# Patient Record
Sex: FEMALE | Race: WHITE | NOT HISPANIC OR LATINO
[De-identification: names, ages, dates, MRNs, and addresses within clinical notes are randomized per-mention and may not be internally consistent; named-entity substitution may affect disease eponyms.]

---

## 2017-05-23 ENCOUNTER — CHART COPY (OUTPATIENT)
Age: 77
End: 2017-05-23

## 2017-10-05 ENCOUNTER — APPOINTMENT (OUTPATIENT)
Dept: ORTHOPEDIC SURGERY | Facility: CLINIC | Age: 77
End: 2017-10-05

## 2017-10-17 ENCOUNTER — APPOINTMENT (OUTPATIENT)
Dept: ORTHOPEDIC SURGERY | Facility: CLINIC | Age: 77
End: 2017-10-17
Payer: MEDICARE

## 2017-10-17 VITALS
HEIGHT: 62 IN | SYSTOLIC BLOOD PRESSURE: 137 MMHG | BODY MASS INDEX: 26.13 KG/M2 | DIASTOLIC BLOOD PRESSURE: 82 MMHG | WEIGHT: 142 LBS | HEART RATE: 71 BPM

## 2017-10-17 PROCEDURE — 73562 X-RAY EXAM OF KNEE 3: CPT | Mod: LT

## 2017-10-17 PROCEDURE — 20610 DRAIN/INJ JOINT/BURSA W/O US: CPT | Mod: LT

## 2017-10-17 PROCEDURE — 99214 OFFICE O/P EST MOD 30 MIN: CPT | Mod: 25

## 2017-11-08 ENCOUNTER — APPOINTMENT (OUTPATIENT)
Dept: ORTHOPEDIC SURGERY | Facility: CLINIC | Age: 77
End: 2017-11-08
Payer: MEDICARE

## 2017-11-08 VITALS
WEIGHT: 140 LBS | HEIGHT: 62 IN | HEART RATE: 93 BPM | DIASTOLIC BLOOD PRESSURE: 90 MMHG | SYSTOLIC BLOOD PRESSURE: 135 MMHG | BODY MASS INDEX: 25.76 KG/M2

## 2017-11-08 PROCEDURE — 96372 THER/PROPH/DIAG INJ SC/IM: CPT

## 2017-11-08 PROCEDURE — 72110 X-RAY EXAM L-2 SPINE 4/>VWS: CPT

## 2017-11-08 PROCEDURE — 99214 OFFICE O/P EST MOD 30 MIN: CPT | Mod: 25

## 2017-11-08 PROCEDURE — 72170 X-RAY EXAM OF PELVIS: CPT | Mod: 59

## 2017-11-08 RX ORDER — NITROFURANTOIN (MONOHYDRATE/MACROCRYSTALS) 25; 75 MG/1; MG/1
100 CAPSULE ORAL
Qty: 14 | Refills: 0 | Status: ACTIVE | COMMUNITY
Start: 2017-10-17

## 2017-11-08 RX ADMIN — KETOROLAC TROMETHAMINE 0 MG/ML: 30 INJECTION, SOLUTION INTRAMUSCULAR; INTRAVENOUS at 00:00

## 2017-11-15 ENCOUNTER — APPOINTMENT (OUTPATIENT)
Dept: UROGYNECOLOGY | Facility: CLINIC | Age: 77
End: 2017-11-15
Payer: MEDICARE

## 2017-11-15 ENCOUNTER — RESULT REVIEW (OUTPATIENT)
Age: 77
End: 2017-11-15

## 2017-11-15 VITALS
SYSTOLIC BLOOD PRESSURE: 132 MMHG | DIASTOLIC BLOOD PRESSURE: 80 MMHG | BODY MASS INDEX: 25.76 KG/M2 | HEIGHT: 62 IN | WEIGHT: 140 LBS

## 2017-11-15 DIAGNOSIS — R35.0 FREQUENCY OF MICTURITION: ICD-10-CM

## 2017-11-15 DIAGNOSIS — R39.15 URGENCY OF URINATION: ICD-10-CM

## 2017-11-15 DIAGNOSIS — N81.10 CYSTOCELE, UNSPECIFIED: ICD-10-CM

## 2017-11-15 DIAGNOSIS — K59.09 OTHER CONSTIPATION: ICD-10-CM

## 2017-11-15 LAB
BILIRUB UR QL STRIP: NORMAL
CLARITY UR: CLEAR
COLLECTION METHOD: NORMAL
GLUCOSE UR-MCNC: NORMAL
HCG UR QL: 0.2 EU/DL
HGB UR QL STRIP.AUTO: NORMAL
KETONES UR-MCNC: NORMAL
LEUKOCYTE ESTERASE UR QL STRIP: NORMAL
NITRITE UR QL STRIP: NORMAL
PH UR STRIP: 5.5
PROT UR STRIP-MCNC: NORMAL
SP GR UR STRIP: 1

## 2017-11-15 PROCEDURE — 51701 INSERT BLADDER CATHETER: CPT

## 2017-11-15 PROCEDURE — 99204 OFFICE O/P NEW MOD 45 MIN: CPT | Mod: 25

## 2017-12-11 ENCOUNTER — APPOINTMENT (OUTPATIENT)
Dept: RADIOLOGY | Facility: CLINIC | Age: 77
End: 2017-12-11

## 2017-12-11 ENCOUNTER — OUTPATIENT (OUTPATIENT)
Dept: OUTPATIENT SERVICES | Facility: HOSPITAL | Age: 77
LOS: 1 days | End: 2017-12-11
Payer: MEDICARE

## 2017-12-11 DIAGNOSIS — Z90.710 ACQUIRED ABSENCE OF BOTH CERVIX AND UTERUS: Chronic | ICD-10-CM

## 2017-12-11 DIAGNOSIS — C50.412 MALIGNANT NEOPLASM OF UPPER-OUTER QUADRANT OF LEFT FEMALE BREAST: ICD-10-CM

## 2017-12-11 DIAGNOSIS — Z98.89 OTHER SPECIFIED POSTPROCEDURAL STATES: Chronic | ICD-10-CM

## 2017-12-11 PROCEDURE — 77080 DXA BONE DENSITY AXIAL: CPT | Mod: 26

## 2017-12-11 PROCEDURE — 77080 DXA BONE DENSITY AXIAL: CPT

## 2018-03-07 ENCOUNTER — APPOINTMENT (OUTPATIENT)
Dept: UROGYNECOLOGY | Facility: CLINIC | Age: 78
End: 2018-03-07

## 2018-08-01 ENCOUNTER — EMERGENCY (EMERGENCY)
Facility: HOSPITAL | Age: 78
LOS: 1 days | Discharge: HOME | End: 2018-08-01
Attending: EMERGENCY MEDICINE | Admitting: EMERGENCY MEDICINE
Payer: MEDICARE

## 2018-08-01 VITALS
HEART RATE: 87 BPM | SYSTOLIC BLOOD PRESSURE: 142 MMHG | TEMPERATURE: 98 F | WEIGHT: 139.99 LBS | OXYGEN SATURATION: 98 % | HEIGHT: 62 IN | DIASTOLIC BLOOD PRESSURE: 72 MMHG | RESPIRATION RATE: 18 BRPM

## 2018-08-01 VITALS
DIASTOLIC BLOOD PRESSURE: 84 MMHG | HEART RATE: 78 BPM | RESPIRATION RATE: 18 BRPM | TEMPERATURE: 98 F | SYSTOLIC BLOOD PRESSURE: 150 MMHG | OXYGEN SATURATION: 98 %

## 2018-08-01 DIAGNOSIS — Z98.89 OTHER SPECIFIED POSTPROCEDURAL STATES: Chronic | ICD-10-CM

## 2018-08-01 DIAGNOSIS — Z90.710 ACQUIRED ABSENCE OF BOTH CERVIX AND UTERUS: Chronic | ICD-10-CM

## 2018-08-01 LAB
ALBUMIN SERPL ELPH-MCNC: 3.7 G/DL — SIGNIFICANT CHANGE UP (ref 3.3–5)
ALP SERPL-CCNC: 63 U/L — SIGNIFICANT CHANGE UP (ref 30–120)
ALT FLD-CCNC: 28 U/L DA — SIGNIFICANT CHANGE UP (ref 10–60)
ANION GAP SERPL CALC-SCNC: 8 MMOL/L — SIGNIFICANT CHANGE UP (ref 5–17)
AST SERPL-CCNC: 24 U/L — SIGNIFICANT CHANGE UP (ref 10–40)
BASOPHILS # BLD AUTO: 0.01 K/UL — SIGNIFICANT CHANGE UP (ref 0–0.2)
BASOPHILS NFR BLD AUTO: 0.2 % — SIGNIFICANT CHANGE UP (ref 0–2)
BILIRUB SERPL-MCNC: 0.6 MG/DL — SIGNIFICANT CHANGE UP (ref 0.2–1.2)
BUN SERPL-MCNC: 14 MG/DL — SIGNIFICANT CHANGE UP (ref 7–23)
CALCIUM SERPL-MCNC: 9.3 MG/DL — SIGNIFICANT CHANGE UP (ref 8.4–10.5)
CHLORIDE SERPL-SCNC: 104 MMOL/L — SIGNIFICANT CHANGE UP (ref 96–108)
CO2 SERPL-SCNC: 28 MMOL/L — SIGNIFICANT CHANGE UP (ref 22–31)
CREAT SERPL-MCNC: 0.67 MG/DL — SIGNIFICANT CHANGE UP (ref 0.5–1.3)
EOSINOPHIL # BLD AUTO: 0.01 K/UL — SIGNIFICANT CHANGE UP (ref 0–0.5)
EOSINOPHIL NFR BLD AUTO: 0.2 % — SIGNIFICANT CHANGE UP (ref 0–6)
ERYTHROCYTE [SEDIMENTATION RATE] IN BLOOD: 13 MM/HR — SIGNIFICANT CHANGE UP (ref 0–20)
GLUCOSE SERPL-MCNC: 110 MG/DL — HIGH (ref 70–99)
HCT VFR BLD CALC: 41.1 % — SIGNIFICANT CHANGE UP (ref 34.5–45)
HGB BLD-MCNC: 13.6 G/DL — SIGNIFICANT CHANGE UP (ref 11.5–15.5)
IMM GRANULOCYTES NFR BLD AUTO: 0.2 % — SIGNIFICANT CHANGE UP (ref 0–1.5)
LYMPHOCYTES # BLD AUTO: 0.58 K/UL — LOW (ref 1–3.3)
LYMPHOCYTES # BLD AUTO: 13 % — SIGNIFICANT CHANGE UP (ref 13–44)
MCHC RBC-ENTMCNC: 29.1 PG — SIGNIFICANT CHANGE UP (ref 27–34)
MCHC RBC-ENTMCNC: 33.1 GM/DL — SIGNIFICANT CHANGE UP (ref 32–36)
MCV RBC AUTO: 88 FL — SIGNIFICANT CHANGE UP (ref 80–100)
MONOCYTES # BLD AUTO: 0.14 K/UL — SIGNIFICANT CHANGE UP (ref 0–0.9)
MONOCYTES NFR BLD AUTO: 3.1 % — SIGNIFICANT CHANGE UP (ref 2–14)
NEUTROPHILS # BLD AUTO: 3.71 K/UL — SIGNIFICANT CHANGE UP (ref 1.8–7.4)
NEUTROPHILS NFR BLD AUTO: 83.3 % — HIGH (ref 43–77)
NRBC # BLD: 0 /100 WBCS — SIGNIFICANT CHANGE UP (ref 0–0)
PLATELET # BLD AUTO: 201 K/UL — SIGNIFICANT CHANGE UP (ref 150–400)
POTASSIUM SERPL-MCNC: 3.8 MMOL/L — SIGNIFICANT CHANGE UP (ref 3.5–5.3)
POTASSIUM SERPL-SCNC: 3.8 MMOL/L — SIGNIFICANT CHANGE UP (ref 3.5–5.3)
PROT SERPL-MCNC: 7.5 G/DL — SIGNIFICANT CHANGE UP (ref 6–8.3)
RBC # BLD: 4.67 M/UL — SIGNIFICANT CHANGE UP (ref 3.8–5.2)
RBC # FLD: 13.1 % — SIGNIFICANT CHANGE UP (ref 10.3–14.5)
SODIUM SERPL-SCNC: 140 MMOL/L — SIGNIFICANT CHANGE UP (ref 135–145)
WBC # BLD: 4.46 K/UL — SIGNIFICANT CHANGE UP (ref 3.8–10.5)
WBC # FLD AUTO: 4.46 K/UL — SIGNIFICANT CHANGE UP (ref 3.8–10.5)

## 2018-08-01 PROCEDURE — 96374 THER/PROPH/DIAG INJ IV PUSH: CPT

## 2018-08-01 PROCEDURE — 85652 RBC SED RATE AUTOMATED: CPT

## 2018-08-01 PROCEDURE — 80053 COMPREHEN METABOLIC PANEL: CPT

## 2018-08-01 PROCEDURE — 85027 COMPLETE CBC AUTOMATED: CPT

## 2018-08-01 PROCEDURE — 99284 EMERGENCY DEPT VISIT MOD MDM: CPT | Mod: 25

## 2018-08-01 PROCEDURE — 86140 C-REACTIVE PROTEIN: CPT

## 2018-08-01 PROCEDURE — 99285 EMERGENCY DEPT VISIT HI MDM: CPT

## 2018-08-01 RX ORDER — ONDANSETRON 8 MG/1
4 TABLET, FILM COATED ORAL ONCE
Qty: 0 | Refills: 0 | Status: DISCONTINUED | OUTPATIENT
Start: 2018-08-01 | End: 2018-08-01

## 2018-08-01 RX ORDER — ONDANSETRON 8 MG/1
1 TABLET, FILM COATED ORAL
Qty: 15 | Refills: 0 | OUTPATIENT
Start: 2018-08-01 | End: 2018-08-05

## 2018-08-01 RX ORDER — ONDANSETRON 8 MG/1
4 TABLET, FILM COATED ORAL ONCE
Qty: 0 | Refills: 0 | Status: COMPLETED | OUTPATIENT
Start: 2018-08-01 | End: 2018-08-01

## 2018-08-01 RX ORDER — MECLIZINE HCL 12.5 MG
1 TABLET ORAL
Qty: 21 | Refills: 0 | OUTPATIENT
Start: 2018-08-01 | End: 2018-08-07

## 2018-08-01 RX ORDER — SODIUM CHLORIDE 9 MG/ML
1000 INJECTION INTRAMUSCULAR; INTRAVENOUS; SUBCUTANEOUS ONCE
Qty: 0 | Refills: 0 | Status: COMPLETED | OUTPATIENT
Start: 2018-08-01 | End: 2018-08-01

## 2018-08-01 RX ORDER — MECLIZINE HCL 12.5 MG
25 TABLET ORAL ONCE
Qty: 0 | Refills: 0 | Status: COMPLETED | OUTPATIENT
Start: 2018-08-01 | End: 2018-08-01

## 2018-08-01 RX ADMIN — SODIUM CHLORIDE 500 MILLILITER(S): 9 INJECTION INTRAMUSCULAR; INTRAVENOUS; SUBCUTANEOUS at 13:17

## 2018-08-01 RX ADMIN — Medication 25 MILLIGRAM(S): at 13:17

## 2018-08-01 RX ADMIN — ONDANSETRON 4 MILLIGRAM(S): 8 TABLET, FILM COATED ORAL at 13:17

## 2018-08-01 RX ADMIN — SODIUM CHLORIDE 1000 MILLILITER(S): 9 INJECTION INTRAMUSCULAR; INTRAVENOUS; SUBCUTANEOUS at 15:05

## 2018-08-01 NOTE — ED ADULT NURSE NOTE - OBJECTIVE STATEMENT
Patient presents to ED with complaints of vertigo, nausea and vomiting that began this morning. As per patient vertigo worsens with head movements and changes in position. Denies any fever, chills or shortness of breath. Reports a mild headache but denies any other pain. Denies any head trauma, fall or LOC.

## 2018-08-01 NOTE — ED ADULT NURSE NOTE - PRO INTERPRETER NEED 2
----- Message from Alicia Parada sent at 3/22/2018 10:16 AM CDT -----  Contact: pt  States the medicine that was called in is making him constipated. Please call pt at [phone]. Thank you   
Returned call, pt stated he was already at ER for constipation  
English

## 2018-08-01 NOTE — ED ADULT NURSE REASSESSMENT NOTE - NS ED NURSE REASSESS COMMENT FT1
Patient was discharged in stable condition, discharge instructions were provided and reviewed, demonstrated understanding, all belongings were taken by patient. Left ambulatory and accompanied by spouse. IV access was discontinued and vital signs taken.

## 2018-08-01 NOTE — ED ADULT NURSE NOTE - FAMILY HISTORY
Father  Still living? No  Family history of pancreatic cancer, Age at diagnosis: Age Unknown     Aunt  Still living? No  Family history of breast cancer, Age at diagnosis: Age Unknown

## 2018-08-01 NOTE — ED ADULT NURSE REASSESSMENT NOTE - NS ED NURSE REASSESS COMMENT FT1
Patient states "feeling better" was given PO fluids tolerated it well. Iv fluids infusing, awaiting results and disposition, nursing care ongoing.

## 2018-08-01 NOTE — ED PROVIDER NOTE - OBJECTIVE STATEMENT
Pt is a 77 y/o F, with PMHx of breast CA s/p chemo, radiation, and masectomy, presenting to the ED with c/o dizziness, onset this AM. Pt reports waking up this AM, got up from bed, and had a sudden onset of room spinning dizziness that caused her to fall backwards onto her bed. No head trauma or LOC. Pt states the dizziness is worse with positional change and head movement. Associated HA, nausea, and vomiting. Denies fever, CP, SOB, weakness, ear pain, and tinnitus. She reports an episode of vertigo 30+yrs ago but is unsure how it was treated.

## 2018-08-01 NOTE — ED PROVIDER NOTE - MEDICAL DECISION MAKING DETAILS
79 yo F with dizziness. Will treat symptomatically and reassess. If sxs do not improve, will consider imaging.

## 2018-08-01 NOTE — CONSULT NOTE ADULT - SUBJECTIVE AND OBJECTIVE BOX
Patient is a 78y old  Female who presents with a chief complaint of     HPI:  Pt is a 79 y/o F, with PMHx of breast CA s/p chemo, radiation, and masectomy, presenting to the ED with c/o dizziness, onset this AM. Pt reports waking up this AM, got up from bed, and had a sudden onset of room spinning dizziness that caused her to fall backwards onto her bed. No head trauma or LOC. Pt states the dizziness is worse with positional change and head movement. Associated HA, nausea, and vomiting. Denies fever, CP, SOB, weakness, ear pain, and tinnitus. She reports an episode of vertigo 30+yrs ago but is unsure how it was treated.      PAST MEDICAL & SURGICAL HISTORY:  Arch pain of right foot  Breast cancer: 2005  Bunion of great toe of right foot  S/P bunionectomy: arch lift 2014  S/P hysterectomy: 1990  S/P lumpectomy, left breast: left 2005      MEDICATIONS  (STANDING):    MEDICATIONS  (PRN):      Allergies    penicillin (Hives; Rash)    Intolerances        SOCIAL HISTORY:    No h/o Smoking.   No h/o alcohol use.  Ex Smoker. Quite in past.  Pt smokes.  Pt does drink socially.  Pt drinks alcohol heavily.    FAMILY HISTORY:  Family history of breast cancer (Aunt)  Family history of pancreatic cancer (Father)      REVIEW OF SYSTEMS:    CONSTITUTIONAL: No fever  EYES: No eye pain,   ENMT:  No sinus or throat pain  NECK: No pain or stiffness  RESPIRATORY: No cough, No hemoptysis; No shortness of breath  CARDIOVASCULAR: No acute chest pain, palpitations,  or leg swelling  GASTROINTESTINAL: No abdominal pain. No nausea, vomiting, or hematemesis;  No melena or hematochezia.  GENITOURINARY: No  hematuria, or incontinence  MUSCULOSKELETAL: No joint swelling; No extremity pain  SKIN: No itching, rashes, or lesions   LYMPH NODES: No enlarged glands  NEUROLOGICAL: No headaches, memory loss,   PSYCHIATRIC: No depression, anxiety, mood swings, or difficulty sleeping  ENDOCRINE: No heat or cold intolerance;   HEME/LYMPH: No easy bruising, or bleeding gums  Allergy/Immunology. No medication allergy. No seasonal allergies.    PHYSICAL EXAM:  Vital Signs Last 24 Hrs  T(F): 97.6 (08-01-18 @ 12:33)  HR: 87 (08-01-18 @ 12:33)  BP: 142/72 (08-01-18 @ 12:33)  RR: 18 (08-01-18 @ 12:33)    GENERAL: NAD, well-groomed, well-developed  HEAD:  Atraumatic, Normocephalic  EYES: EOMI, PERRLA, conjunctiva and sclera clear  NECK: Supple, No JVD, thyroid non-palpable    On Neurological Examination:    Mental Status - Pt is alert, awake, oriented X3. Higher functions are intact. Pt. does have mild poor cognition. Follows commands well and able to answer questions appropriately.    Speech -  Normal. Slurred. Pt has no aphasia.    Cranial Nerves - Pupils 3 mm equal and reactive to light, extraocular eye movements intact. Pt has no visual field deficit.  Pt has no right left facial asymmetry. Tongue - is in midline.    Motor Exam - 4 plus/5 all over, No drift. No shaking or tremors.  Muscle tone - is normal all over. Moves all extremities equally. No asymmetry is seen.      Sensory Exam - Pin prick, temperature, joint position and vibration are intact on either side. Pt withdraws all extremities equally on stimulation. No asymmetry seen. No complaints of tingling, numbness.    Gait - Able to stand and walk unassisted. Pt is able to stand up with holding my hands and is able to walk for few feet around the bed. Not falling to either side.    Deep tendon Reflexes - 2 plus all over.    Coordination - Fine finger movements are normal on both sides. Finger to nose is also normal on both sides.       Romberg - Negative.    Neck Supple -  Yes.    LABS:                        13.6   4.46  )-----------( 201      ( 01 Aug 2018 13:28 )             41.1     08-01    140  |  104  |  14  ----------------------------<  110<H>  3.8   |  28  |  0.67    Ca    9.3      01 Aug 2018 13:28    TPro  7.5  /  Alb  3.7  /  TBili  0.6  /  DBili  x   /  AST  24  /  ALT  28  /  AlkPhos  63  08-01          RADIOLOGY & ADDITIONAL STUDIES: Pt was discharged before I could see her.

## 2018-08-01 NOTE — ED ADULT NURSE NOTE - NSIMPLEMENTINTERV_GEN_ALL_ED
Implemented All Universal Safety Interventions:  [place] to call system. Call bell, personal items and telephone within reach. Instruct patient to call for assistance. Room bathroom lighting operational. Non-slip footwear when patient is off stretcher. Physically safe environment: no spills, clutter or unnecessary equipment. Stretcher in lowest position, wheels locked, appropriate side rails in place.

## 2018-08-02 LAB — CRP SERPL-MCNC: 0.17 MG/DL — SIGNIFICANT CHANGE UP (ref 0–0.4)

## 2018-09-23 ENCOUNTER — INPATIENT (INPATIENT)
Facility: HOSPITAL | Age: 78
LOS: 0 days | Discharge: HOME | DRG: 149 | End: 2018-09-24
Attending: INTERNAL MEDICINE | Admitting: INTERNAL MEDICINE
Payer: COMMERCIAL

## 2018-09-23 VITALS
OXYGEN SATURATION: 99 % | HEIGHT: 62 IN | DIASTOLIC BLOOD PRESSURE: 85 MMHG | SYSTOLIC BLOOD PRESSURE: 155 MMHG | WEIGHT: 141.98 LBS | TEMPERATURE: 97 F | HEART RATE: 85 BPM | RESPIRATION RATE: 14 BRPM

## 2018-09-23 DIAGNOSIS — Z98.89 OTHER SPECIFIED POSTPROCEDURAL STATES: Chronic | ICD-10-CM

## 2018-09-23 DIAGNOSIS — R42 DIZZINESS AND GIDDINESS: ICD-10-CM

## 2018-09-23 DIAGNOSIS — C50.919 MALIGNANT NEOPLASM OF UNSPECIFIED SITE OF UNSPECIFIED FEMALE BREAST: ICD-10-CM

## 2018-09-23 DIAGNOSIS — Z90.710 ACQUIRED ABSENCE OF BOTH CERVIX AND UTERUS: Chronic | ICD-10-CM

## 2018-09-23 DIAGNOSIS — I82.409 ACUTE EMBOLISM AND THROMBOSIS OF UNSPECIFIED DEEP VEINS OF UNSPECIFIED LOWER EXTREMITY: ICD-10-CM

## 2018-09-23 LAB
ALBUMIN SERPL ELPH-MCNC: 3.7 G/DL — SIGNIFICANT CHANGE UP (ref 3.3–5)
ALP SERPL-CCNC: 68 U/L — SIGNIFICANT CHANGE UP (ref 30–120)
ALT FLD-CCNC: 29 U/L DA — SIGNIFICANT CHANGE UP (ref 10–60)
ANION GAP SERPL CALC-SCNC: 3 MMOL/L — LOW (ref 5–17)
APTT BLD: 26.8 SEC — LOW (ref 27.5–37.4)
AST SERPL-CCNC: 24 U/L — SIGNIFICANT CHANGE UP (ref 10–40)
BASOPHILS # BLD AUTO: 0.02 K/UL — SIGNIFICANT CHANGE UP (ref 0–0.2)
BASOPHILS NFR BLD AUTO: 0.3 % — SIGNIFICANT CHANGE UP (ref 0–2)
BILIRUB SERPL-MCNC: 0.4 MG/DL — SIGNIFICANT CHANGE UP (ref 0.2–1.2)
BUN SERPL-MCNC: 17 MG/DL — SIGNIFICANT CHANGE UP (ref 7–23)
CALCIUM SERPL-MCNC: 8.9 MG/DL — SIGNIFICANT CHANGE UP (ref 8.4–10.5)
CHLORIDE SERPL-SCNC: 105 MMOL/L — SIGNIFICANT CHANGE UP (ref 96–108)
CO2 SERPL-SCNC: 30 MMOL/L — SIGNIFICANT CHANGE UP (ref 22–31)
CREAT SERPL-MCNC: 0.63 MG/DL — SIGNIFICANT CHANGE UP (ref 0.5–1.3)
EOSINOPHIL # BLD AUTO: 0.03 K/UL — SIGNIFICANT CHANGE UP (ref 0–0.5)
EOSINOPHIL NFR BLD AUTO: 0.5 % — SIGNIFICANT CHANGE UP (ref 0–6)
GLUCOSE SERPL-MCNC: 104 MG/DL — HIGH (ref 70–99)
HCT VFR BLD CALC: 40 % — SIGNIFICANT CHANGE UP (ref 34.5–45)
HGB BLD-MCNC: 13.2 G/DL — SIGNIFICANT CHANGE UP (ref 11.5–15.5)
IMM GRANULOCYTES NFR BLD AUTO: 0.2 % — SIGNIFICANT CHANGE UP (ref 0–1.5)
INR BLD: 1.01 RATIO — SIGNIFICANT CHANGE UP (ref 0.88–1.16)
LYMPHOCYTES # BLD AUTO: 0.64 K/UL — LOW (ref 1–3.3)
LYMPHOCYTES # BLD AUTO: 10.9 % — LOW (ref 13–44)
MCHC RBC-ENTMCNC: 29.7 PG — SIGNIFICANT CHANGE UP (ref 27–34)
MCHC RBC-ENTMCNC: 33 GM/DL — SIGNIFICANT CHANGE UP (ref 32–36)
MCV RBC AUTO: 90.1 FL — SIGNIFICANT CHANGE UP (ref 80–100)
MONOCYTES # BLD AUTO: 0.27 K/UL — SIGNIFICANT CHANGE UP (ref 0–0.9)
MONOCYTES NFR BLD AUTO: 4.6 % — SIGNIFICANT CHANGE UP (ref 2–14)
NEUTROPHILS # BLD AUTO: 4.89 K/UL — SIGNIFICANT CHANGE UP (ref 1.8–7.4)
NEUTROPHILS NFR BLD AUTO: 83.5 % — HIGH (ref 43–77)
NRBC # BLD: 0 /100 WBCS — SIGNIFICANT CHANGE UP (ref 0–0)
PLATELET # BLD AUTO: 215 K/UL — SIGNIFICANT CHANGE UP (ref 150–400)
POTASSIUM SERPL-MCNC: 3.9 MMOL/L — SIGNIFICANT CHANGE UP (ref 3.5–5.3)
POTASSIUM SERPL-SCNC: 3.9 MMOL/L — SIGNIFICANT CHANGE UP (ref 3.5–5.3)
PROT SERPL-MCNC: 7.3 G/DL — SIGNIFICANT CHANGE UP (ref 6–8.3)
PROTHROM AB SERPL-ACNC: 11 SEC — SIGNIFICANT CHANGE UP (ref 9.8–12.7)
RBC # BLD: 4.44 M/UL — SIGNIFICANT CHANGE UP (ref 3.8–5.2)
RBC # FLD: 12.9 % — SIGNIFICANT CHANGE UP (ref 10.3–14.5)
SODIUM SERPL-SCNC: 138 MMOL/L — SIGNIFICANT CHANGE UP (ref 135–145)
WBC # BLD: 5.86 K/UL — SIGNIFICANT CHANGE UP (ref 3.8–10.5)
WBC # FLD AUTO: 5.86 K/UL — SIGNIFICANT CHANGE UP (ref 3.8–10.5)

## 2018-09-23 PROCEDURE — 99222 1ST HOSP IP/OBS MODERATE 55: CPT | Mod: AI

## 2018-09-23 PROCEDURE — 93010 ELECTROCARDIOGRAM REPORT: CPT

## 2018-09-23 PROCEDURE — 99285 EMERGENCY DEPT VISIT HI MDM: CPT

## 2018-09-23 RX ORDER — LIDOCAINE 4 G/100G
1 CREAM TOPICAL ONCE
Qty: 0 | Refills: 0 | Status: COMPLETED | OUTPATIENT
Start: 2018-09-23 | End: 2018-09-23

## 2018-09-23 RX ORDER — OXYCODONE HYDROCHLORIDE 5 MG/1
5 TABLET ORAL EVERY 4 HOURS
Qty: 0 | Refills: 0 | Status: DISCONTINUED | OUTPATIENT
Start: 2018-09-23 | End: 2018-09-24

## 2018-09-23 RX ORDER — KETOROLAC TROMETHAMINE 30 MG/ML
15 SYRINGE (ML) INJECTION ONCE
Qty: 0 | Refills: 0 | Status: DISCONTINUED | OUTPATIENT
Start: 2018-09-23 | End: 2018-09-23

## 2018-09-23 RX ORDER — MECLIZINE HCL 12.5 MG
25 TABLET ORAL THREE TIMES A DAY
Qty: 0 | Refills: 0 | Status: DISCONTINUED | OUTPATIENT
Start: 2018-09-23 | End: 2018-09-24

## 2018-09-23 RX ORDER — ENOXAPARIN SODIUM 100 MG/ML
40 INJECTION SUBCUTANEOUS DAILY
Qty: 0 | Refills: 0 | Status: DISCONTINUED | OUTPATIENT
Start: 2018-09-23 | End: 2018-09-24

## 2018-09-23 RX ORDER — MECLIZINE HCL 12.5 MG
25 TABLET ORAL ONCE
Qty: 0 | Refills: 0 | Status: COMPLETED | OUTPATIENT
Start: 2018-09-23 | End: 2018-09-23

## 2018-09-23 RX ORDER — ONDANSETRON 8 MG/1
4 TABLET, FILM COATED ORAL ONCE
Qty: 0 | Refills: 0 | Status: COMPLETED | OUTPATIENT
Start: 2018-09-23 | End: 2018-09-23

## 2018-09-23 RX ADMIN — Medication 25 MILLIGRAM(S): at 12:07

## 2018-09-23 RX ADMIN — ONDANSETRON 4 MILLIGRAM(S): 8 TABLET, FILM COATED ORAL at 11:52

## 2018-09-23 RX ADMIN — LIDOCAINE 1 PATCH: 4 CREAM TOPICAL at 13:34

## 2018-09-23 RX ADMIN — Medication 15 MILLIGRAM(S): at 15:00

## 2018-09-23 RX ADMIN — OXYCODONE HYDROCHLORIDE 5 MILLIGRAM(S): 5 TABLET ORAL at 17:41

## 2018-09-23 RX ADMIN — ENOXAPARIN SODIUM 40 MILLIGRAM(S): 100 INJECTION SUBCUTANEOUS at 17:42

## 2018-09-23 RX ADMIN — Medication 15 MILLIGRAM(S): at 13:34

## 2018-09-23 RX ADMIN — OXYCODONE HYDROCHLORIDE 5 MILLIGRAM(S): 5 TABLET ORAL at 21:31

## 2018-09-23 RX ADMIN — OXYCODONE HYDROCHLORIDE 5 MILLIGRAM(S): 5 TABLET ORAL at 22:20

## 2018-09-23 RX ADMIN — Medication 25 MILLIGRAM(S): at 21:31

## 2018-09-23 RX ADMIN — OXYCODONE HYDROCHLORIDE 5 MILLIGRAM(S): 5 TABLET ORAL at 18:22

## 2018-09-23 NOTE — ED PROVIDER NOTE - OBJECTIVE STATEMENT
77 y/o F pt with hx of vertigo, HLD, and breast cancer treated with chemo and radiation 11 years ago presents to the ED c/o dizziness since this morning, upon waking up. Pt states that dizziness aggravated with turning head to the right. Associated with nausea and vomiting. She took a Zofran today, after which she vomitted. Similar sx for which she was seen in the ED a couple weeks ago, treated with Antivert. Pt admits that she has not taken an Antivert today PTA. Denies headache, tinnitus, LOC,  weakness or numbness in extremities. No other complaints at this time.

## 2018-09-23 NOTE — H&P ADULT - NSHPLABSRESULTS_GEN_ALL_CORE
13.2   5.86  )-----------( 215      ( 23 Sep 2018 12:40 )             40.0       09-23    138  |  105  |  17  ----------------------------<  104<H>  3.9   |  30  |  0.63    Ca    8.9      23 Sep 2018 12:40    TPro  7.3  /  Alb  3.7  /  TBili  0.4  /  DBili  x   /  AST  24  /  ALT  29  /  AlkPhos  68  09-23                  PT/INR - ( 23 Sep 2018 12:40 )   PT: 11.0 sec;   INR: 1.01 ratio         PTT - ( 23 Sep 2018 12:40 )  PTT:26.8 sec    Lactate Trend            CAPILLARY BLOOD GLUCOSE

## 2018-09-23 NOTE — CONSULT NOTE ADULT - SUBJECTIVE AND OBJECTIVE BOX
Patient is a 78y old  Female who presents with a chief complaint of Vertigo.    HPI: 77 y/o F pt with hx of vertigo, HLD, and breast cancer treated with chemo and radiation 11 years ago presents to the ED c/o dizziness since this morning, upon waking up. Pt states that dizziness aggravated with turning head to the right. Associated with nausea and vomiting. She took a Zofran today, after which she vomitted. Similar sx for which she was seen in the ED a couple weeks ago, treated with Antivert. Pt admits that she has not taken an Antivert today PTA. Denies headache, tinnitus, LOC,  weakness or numbness in extremities. No other complaints at this time.  No difficulty speaking or swallowing.  No c/o blurry or double vision.  No lateralized weakness.   and daughter are at bedside.  Pt was in ED a month ago for same complaints.  Does admit to put q tips in ears on occasion.  NIHSS  -0  Not a tPa candidate as Dx of CVA is much less likely.    PAST MEDICAL & SURGICAL HISTORY:  Arch pain of right foot  Breast cancer: 2005  Bunion of great toe of right foot  S/P bunionectomy: arch lift 2014  S/P hysterectomy: 1990  S/P lumpectomy, left breast: left 2005    Allergies    penicillin (Hives; Rash)      SOCIAL HISTORY:    No h/o Smoking.   No h/o alcohol use.    FAMILY HISTORY:  Family history of breast cancer (Aunt)  Family history of pancreatic cancer (Father)      REVIEW OF SYSTEMS:    CONSTITUTIONAL: No fever  EYES: No eye pain,   ENMT:  No sinus or throat pain  NECK: No pain or stiffness  RESPIRATORY: No cough, No hemoptysis; No shortness of breath  CARDIOVASCULAR: No acute chest pain, palpitations,  or leg swelling  GASTROINTESTINAL: No abdominal pain. No nausea, vomiting, or hematemesis;  No melena or hematochezia.  GENITOURINARY: No  hematuria, or incontinence  MUSCULOSKELETAL: No joint swelling; No extremity pain  SKIN: No itching, rashes, or lesions   LYMPH NODES: No enlarged glands  NEUROLOGICAL: No headaches, memory loss,   PSYCHIATRIC: No depression, anxiety, mood swings, or difficulty sleeping  ENDOCRINE: No heat or cold intolerance;   HEME/LYMPH: No easy bruising, or bleeding gums  Allergy/Immunology. No medication allergy. No seasonal allergies.  GYN  -H/o Left breast Ca 11 yrs ago.    PHYSICAL EXAM:  Vital Signs Last 24 Hrs  T(F): 97.2 (09-23-18 @ 10:52)  HR: 82 (09-23-18 @ 12:42)  BP: 122/66 (09-23-18 @ 12:42)  RR: 19 (09-23-18 @ 12:42)    GENERAL: NAD, well-groomed, well-developed  HEAD:  Atraumatic, Normocephalic  EYES: EOMI, PERRLA, conjunctiva and sclera clear  NECK: Supple, No JVD, thyroid non-palpable    On Neurological Examination:    Mental Status - Pt is alert, awake, oriented X3. Higher functions are intact.  Follows commands well and able to answer questions appropriately.    Speech -  Normal. Pt has no aphasia.    Cranial Nerves - Pupils 3 mm equal and reactive to light, extraocular eye movements intact. Pt has no visual field deficit.  Pt has no facial asymmetry. Tongue - is in midline.    Motor Exam - 4 plus/5 all over, No drift. No shaking or tremors.  Muscle tone - is normal all over. Moves all extremities equally. No asymmetry is seen.      Sensory Exam - Pin prick, temperature, joint position and vibration are intact on either side. Pt withdraws all extremities equally on stimulation. No asymmetry seen. No complaints of tingling, numbness.    Gait - Able to stand and walk unassisted.     Deep tendon Reflexes - 2 plus all over.    Coordination - Fine finger movements are normal on both sides. Finger to nose is also normal on both sides.       Romberg - Negative.    Neck Supple -  Yes.    LABS:                        13.2   5.86  )-----------( 215      ( 23 Sep 2018 12:40 )             40.0     09-23    138  |  105  |  17  ----------------------------<  104<H>  3.9   |  30  |  0.63    Ca    8.9      23 Sep 2018 12:40    TPro  7.3  /  Alb  3.7  /  TBili  0.4  /  DBili  x   /  AST  24  /  ALT  29  /  AlkPhos  68  09-23    PT/INR - ( 23 Sep 2018 12:40 )   PT: 11.0 sec;   INR: 1.01 ratio        PTT - ( 23 Sep 2018 12:40 )  PTT:26.8 sec    RADIOLOGY & ADDITIONAL STUDIES:    Awaits CT Head

## 2018-09-23 NOTE — H&P ADULT - NSHPPHYSICALEXAM_GEN_ALL_CORE
PHYSICAL EXAM:  Vital Signs Last 24 Hrs  T(C): 36.2 (23 Sep 2018 10:52), Max: 36.2 (23 Sep 2018 10:52)  T(F): 97.2 (23 Sep 2018 10:52), Max: 97.2 (23 Sep 2018 10:52)  HR: 82 (23 Sep 2018 12:42) (80 - 85)  BP: 122/66 (23 Sep 2018 12:42) (122/66 - 155/85)  BP(mean): --  RR: 19 (23 Sep 2018 12:42) (14 - 19)  SpO2: 99% (23 Sep 2018 12:42) (99% - 100%)    GENERAL: positional vertigo/dizzy   HEAD:  Atraumatic, Normocephalic  EYES: EOMI, PERRLA, conjunctiva and sclera clear  ENMT: No tonsillar erythema, exudates, or enlargement; Moist mucous membranes, Good dentition, No lesions  NECK: Supple, No JVD, Normal thyroid  NERVOUS SYSTEM:  Alert & Oriented X3,   Good concentration; Motor Strength 5/5 B/L upper and lower extremities;   CHEST/LUNG: Clear to auscultation bilaterally; No rales, rhonchi, wheezing, or rubs  HEART: Regular rate and rhythm; No murmurs, rubs, or gallops  ABDOMEN: Soft, Nontender, Nondistended; Bowel sounds present  EXTREMITIES:  2+ Peripheral Pulses, No clubbing, cyanosis, or edema  LYMPH: No lymphadenopathy noted  SKIN: No rashes or lesions

## 2018-09-23 NOTE — H&P ADULT - FAMILY HISTORY
Family history of pancreatic cancer     Aunt  Still living? No  Family history of breast cancer, Age at diagnosis: Age Unknown

## 2018-09-23 NOTE — ED ADULT NURSE NOTE - NSIMPLEMENTINTERV_GEN_ALL_ED
Implemented All Fall with Harm Risk Interventions:  [place] to call system. Call bell, personal items and telephone within reach. Instruct patient to call for assistance. Room bathroom lighting operational. Non-slip footwear when patient is off stretcher. Physically safe environment: no spills, clutter or unnecessary equipment. Stretcher in lowest position, wheels locked, appropriate side rails in place. Provide visual cue, wrist band, yellow gown, etc. Monitor gait and stability. Monitor for mental status changes and reorient to person, place, and time. Review medications for side effects contributing to fall risk. Reinforce activity limits and safety measures with patient and family. Provide visual clues: red socks.

## 2018-09-23 NOTE — CONSULT NOTE ADULT - ASSESSMENT
Pt is seen for Dizziness.  C/o Vertigo.  No lateralized weakness.  No seizure activity is reported.  Limited but non focal exam.  CT Head - pending.  No signs of CVA.  H/o left Breast Ca  -Brain mets would need to be ruled out.  Pt did not have any scans to r/o mets for quite some time.  Symptoms likely secondary to Vestibuloneuronitis/ Labrynthitis/Benign positional vertigo.  Recommend Meclizine 12.5 mg PO TID standing dose for now.  MRI Brain pre/post with gado in am.  D/w ED physician, Dr. Etienne.  D/w Pt//daughter at bedside. Questions answered.  Would continue to follow.

## 2018-09-23 NOTE — ED ADULT NURSE NOTE - CHPI ED NUR SYMPTOMS NEG
no change in level of consciousness/no numbness/no fever/no confusion/no blurred vision/no loss of consciousness/no vomiting/no weakness

## 2018-09-23 NOTE — H&P ADULT - HISTORY OF PRESENT ILLNESS
77 y/o F pt with hx of vertigo, HLD, and breast cancer treated with chemo and radiation 11 years ago presents to the ED c/o dizziness since this morning, upon waking up. Pt states that dizziness aggravated with turning head to the right. Associated with nausea and vomiting. She took a Zofran today, after which she vomitted. Similar sx for which she was seen in the ED a couple weeks ago, treated with Antivert. Pt admits that she has not taken an Antivert today PTA. Denies headache, tinnitus, LOC,  weakness or numbness in extremities. No other complaints at, was not offered TPA as likely to be CVA effect, pt denies any cp, palptaiton, sob, abdominal or urianry changes

## 2018-09-23 NOTE — ED ADULT NURSE REASSESSMENT NOTE - NS ED NURSE REASSESS COMMENT FT1
pt requesting pain meds explained muscle relaxants will aggravate vertigo s/s wants heating pad explained already gave self burn to back called dr bower and ordered morphine but pt refusing morphine md aware pt states okay just let me go to my bed I am okay  report to [facility] and pt transported
pt states feeling better now dizziness better nausea better evaluated by dr castillo and to be admitted for mri in am
pt still c/o back pains wants heat pack explained already has redness to back from heat pack yesterday pt pending admission called [facility] spoke with david and will call back when ready
pt c/o back pains and increased with stretcher repositioned and meds given for pains pending admission

## 2018-09-23 NOTE — H&P ADULT - NSHPREVIEWOFSYSTEMS_GEN_ALL_CORE
REVIEW OF SYSTEMS:  CONSTITUTIONAL: dizziness/vertigo  EYES: No eye pain, visual disturbances, or discharge  ENMT:  No difficulty hearing, tinnitus, vertigo; No sinus or throat pain  NECK: No pain or stiffness  BREASTS: No pain, masses, or nipple discharge  RESPIRATORY: No cough, wheezing, chills or hemoptysis; No shortness of breath  CARDIOVASCULAR: No chest pain, palpitations, dizziness, or leg swelling  GASTROINTESTINAL: No abdominal or epigastric pain.  GENITOURINARY: No dysuria, frequency, hematuria, or incontinence  NEUROLOGICAL: No headaches, memory loss, loss of strength, numbness, or tremors  SKIN: No itching, burning, rashes, or lesions   LYMPH NODES: No enlarged glands  ENDOCRINE: No heat or cold intolerance; No hair loss; No polydipsia or polyuria  HEME/LYMPH: No easy bruising, or bleeding gums  ALLERGY AND IMMUNOLOGIC: No hives or eczema

## 2018-09-23 NOTE — H&P ADULT - ASSESSMENT
77 y/o F pt with hx of vertigo, HLD, and breast cancer treated with chemo and radiation 11 years ago presents to the ED c/o dizziness since this morning, upon waking up. Pt states that dizziness aggravated with turning head to the right. Associated with nausea and vomiting, admitted for vertigo, need mri to r/o metastatic process

## 2018-09-23 NOTE — ED ADULT NURSE NOTE - OBJECTIVE STATEMENT
pt c/o dizziness worse with lying flat and turning head to right side h/o vertigo in past on no meds now no headache no chest pains pt aaox3 skin warm and dry no resp distress lungs clear and equal b/l ascultation abd soft non tender + bs pt c/o dizziness worse with lying flat and turning head to right side h/o vertigo in past on no meds now no headache no chest pains pt aaox3 skin warm and dry no resp distress lungs clear and equal b/l ascultation abd soft non tender + bs  1st degree burn to back due to heating pad pt was applying for pains yesterday

## 2018-09-24 ENCOUNTER — OUTPATIENT (OUTPATIENT)
Dept: OUTPATIENT SERVICES | Facility: HOSPITAL | Age: 78
LOS: 1 days | End: 2018-09-24
Payer: COMMERCIAL

## 2018-09-24 ENCOUNTER — TRANSCRIPTION ENCOUNTER (OUTPATIENT)
Age: 78
End: 2018-09-24

## 2018-09-24 VITALS
SYSTOLIC BLOOD PRESSURE: 113 MMHG | DIASTOLIC BLOOD PRESSURE: 64 MMHG | OXYGEN SATURATION: 98 % | RESPIRATION RATE: 16 BRPM | TEMPERATURE: 98 F | HEART RATE: 60 BPM

## 2018-09-24 DIAGNOSIS — Z90.710 ACQUIRED ABSENCE OF BOTH CERVIX AND UTERUS: Chronic | ICD-10-CM

## 2018-09-24 DIAGNOSIS — Z98.89 OTHER SPECIFIED POSTPROCEDURAL STATES: Chronic | ICD-10-CM

## 2018-09-24 DIAGNOSIS — R42 DIZZINESS AND GIDDINESS: ICD-10-CM

## 2018-09-24 LAB
ALBUMIN SERPL ELPH-MCNC: 3.3 G/DL — SIGNIFICANT CHANGE UP (ref 3.3–5)
ALP SERPL-CCNC: 59 U/L — SIGNIFICANT CHANGE UP (ref 30–120)
ALT FLD-CCNC: 26 U/L DA — SIGNIFICANT CHANGE UP (ref 10–60)
ANION GAP SERPL CALC-SCNC: 6 MMOL/L — SIGNIFICANT CHANGE UP (ref 5–17)
AST SERPL-CCNC: 21 U/L — SIGNIFICANT CHANGE UP (ref 10–40)
BILIRUB SERPL-MCNC: 0.3 MG/DL — SIGNIFICANT CHANGE UP (ref 0.2–1.2)
BUN SERPL-MCNC: 16 MG/DL — SIGNIFICANT CHANGE UP (ref 7–23)
CALCIUM SERPL-MCNC: 8.8 MG/DL — SIGNIFICANT CHANGE UP (ref 8.4–10.5)
CHLORIDE SERPL-SCNC: 105 MMOL/L — SIGNIFICANT CHANGE UP (ref 96–108)
CO2 SERPL-SCNC: 28 MMOL/L — SIGNIFICANT CHANGE UP (ref 22–31)
CREAT SERPL-MCNC: 0.61 MG/DL — SIGNIFICANT CHANGE UP (ref 0.5–1.3)
GLUCOSE SERPL-MCNC: 91 MG/DL — SIGNIFICANT CHANGE UP (ref 70–99)
HCT VFR BLD CALC: 37.9 % — SIGNIFICANT CHANGE UP (ref 34.5–45)
HGB BLD-MCNC: 12.4 G/DL — SIGNIFICANT CHANGE UP (ref 11.5–15.5)
MCHC RBC-ENTMCNC: 29.6 PG — SIGNIFICANT CHANGE UP (ref 27–34)
MCHC RBC-ENTMCNC: 32.7 GM/DL — SIGNIFICANT CHANGE UP (ref 32–36)
MCV RBC AUTO: 90.5 FL — SIGNIFICANT CHANGE UP (ref 80–100)
NRBC # BLD: 0 /100 WBCS — SIGNIFICANT CHANGE UP (ref 0–0)
PLATELET # BLD AUTO: 223 K/UL — SIGNIFICANT CHANGE UP (ref 150–400)
POTASSIUM SERPL-MCNC: 3.8 MMOL/L — SIGNIFICANT CHANGE UP (ref 3.5–5.3)
POTASSIUM SERPL-SCNC: 3.8 MMOL/L — SIGNIFICANT CHANGE UP (ref 3.5–5.3)
PROT SERPL-MCNC: 6.7 G/DL — SIGNIFICANT CHANGE UP (ref 6–8.3)
RBC # BLD: 4.19 M/UL — SIGNIFICANT CHANGE UP (ref 3.8–5.2)
RBC # FLD: 13 % — SIGNIFICANT CHANGE UP (ref 10.3–14.5)
SODIUM SERPL-SCNC: 139 MMOL/L — SIGNIFICANT CHANGE UP (ref 135–145)
WBC # BLD: 4.59 K/UL — SIGNIFICANT CHANGE UP (ref 3.8–10.5)
WBC # FLD AUTO: 4.59 K/UL — SIGNIFICANT CHANGE UP (ref 3.8–10.5)

## 2018-09-24 PROCEDURE — G8979: CPT

## 2018-09-24 PROCEDURE — 97161 PT EVAL LOW COMPLEX 20 MIN: CPT

## 2018-09-24 PROCEDURE — G8980: CPT

## 2018-09-24 PROCEDURE — 36415 COLL VENOUS BLD VENIPUNCTURE: CPT

## 2018-09-24 PROCEDURE — 85610 PROTHROMBIN TIME: CPT

## 2018-09-24 PROCEDURE — 93005 ELECTROCARDIOGRAM TRACING: CPT

## 2018-09-24 PROCEDURE — 85730 THROMBOPLASTIN TIME PARTIAL: CPT

## 2018-09-24 PROCEDURE — 99285 EMERGENCY DEPT VISIT HI MDM: CPT

## 2018-09-24 PROCEDURE — G8978: CPT

## 2018-09-24 PROCEDURE — 80053 COMPREHEN METABOLIC PANEL: CPT

## 2018-09-24 PROCEDURE — 85027 COMPLETE CBC AUTOMATED: CPT

## 2018-09-24 PROCEDURE — 70553 MRI BRAIN STEM W/O & W/DYE: CPT | Mod: 26

## 2018-09-24 PROCEDURE — 70553 MRI BRAIN STEM W/O & W/DYE: CPT

## 2018-09-24 PROCEDURE — 99239 HOSP IP/OBS DSCHRG MGMT >30: CPT

## 2018-09-24 PROCEDURE — A9579: CPT

## 2018-09-24 RX ORDER — MECLIZINE HCL 12.5 MG
1 TABLET ORAL
Qty: 90 | Refills: 0 | OUTPATIENT
Start: 2018-09-24 | End: 2018-10-23

## 2018-09-24 RX ADMIN — OXYCODONE HYDROCHLORIDE 5 MILLIGRAM(S): 5 TABLET ORAL at 06:00

## 2018-09-24 RX ADMIN — LIDOCAINE 1 PATCH: 4 CREAM TOPICAL at 00:47

## 2018-09-24 RX ADMIN — Medication 25 MILLIGRAM(S): at 13:18

## 2018-09-24 RX ADMIN — Medication 25 MILLIGRAM(S): at 05:03

## 2018-09-24 RX ADMIN — OXYCODONE HYDROCHLORIDE 5 MILLIGRAM(S): 5 TABLET ORAL at 05:05

## 2018-09-24 NOTE — DISCHARGE NOTE ADULT - CARE PLAN
Principal Discharge DX:	Vertigo  Goal:	CVA ruled out  Assessment and plan of treatment:	likely vestibular disease   trial of meclizine  f/u ENT as outpt  Secondary Diagnosis:	Bunion of great toe of right foot

## 2018-09-24 NOTE — PROGRESS NOTE ADULT - PROBLEM SELECTOR PLAN 1
ct head no acute findnigs  check orthostatic  mri of brain if negtative will d/c with outpt ENT follow up  trial of meclizine  pt amarisal

## 2018-09-24 NOTE — DISCHARGE NOTE ADULT - CARE PROVIDER_API CALL
Tyrel Joshi), Medicine  [address] [place] [address]  Phone: [phone]  Fax: [phone]

## 2018-09-24 NOTE — DISCHARGE NOTE ADULT - MEDICATION SUMMARY - MEDICATIONS TO TAKE
I will START or STAY ON the medications listed below when I get home from the hospital:    Zofran ODT 4 mg oral tablet, disintegrating  -- 1 tab(s) by mouth 3 times a day   -- Indication: For nasuea    Antivert 25 mg oral tablet  -- 1 tab(s) by mouth 3 times a day   -- May cause drowsiness.  Alcohol may intensify this effect.  Use care when operating dangerous machinery.    -- Indication: For Vertigo    Lipitor  -- Indication: For hld

## 2018-09-24 NOTE — DISCHARGE NOTE ADULT - PATIENT PORTAL LINK FT
You can access the [facility] Patient Portal, offered by [facility], by registering with the following website: [email or web address]

## 2018-09-24 NOTE — DISCHARGE NOTE ADULT - HOSPITAL COURSE
77 y/o F pt with hx of vertigo, HLD, and breast cancer treated with chemo and radiation 11 years ago presents to the ED c/o dizziness since this morning, upon waking up. Pt states that dizziness aggravated with turning head to the right. Associated with nausea and vomiting, admitted for vertigo, need mri to r/o metastatic process     : Vertigo.  Plan: ct head no acute findnigs  mri of brain negtative will d/c with outpt ENT follow up  trial of meclizine  pt eval  Breast cancer.  Plan: f/u nc as outpt, was treated with chemo/radiation 11 years ago.       DVT (deep venous thrombosis).  Plan: dvt prophalxis with lovenox.     pt can proceed with d/c planning with close outpt follow up

## 2018-09-24 NOTE — PROGRESS NOTE ADULT - ASSESSMENT
Pt is seen for Dizziness.  C/o Vertigo.  No lateralized weakness.  No seizure activity is reported.  Non focal exam.  H/o left Breast Ca  -Brain mets would need to be ruled out.  Pt did not have any scans to r/o mets for quite some time.  Symptoms likely secondary to Vestibuloneuronitis/ Labrynthitis/Benign positional vertigo.  MRI Brain pre/post with gado is done  -Unremarkable  CVA/Mass/Mets ruled out.  D/w Pt//daughter at bedside. Questions answered.  Neurologically pt could be discharged home on Meclizine 12.5 mg PO TID PRN.  F/up with her PMD. Pt is seen for Dizziness.  C/o Vertigo.  No lateralized weakness.  No seizure activity is reported.  Non focal exam.  H/o left Breast Ca  -Brain mets would need to be ruled out.  Pt did not have any scans to r/o mets for quite some time.  Symptoms likely secondary to Vestibuloneuronitis/ Labrynthitis/Benign positional vertigo.  MRI Brain pre/post with gado is done  -Unremarkable  CVA/Mass/Mets ruled out.  D/w Pt. Questions answered.  Neurologically pt could be discharged home on Meclizine 12.5 mg PO TID PRN.  F/up with her PMD.  D/w covering nurse.

## 2018-09-24 NOTE — PROGRESS NOTE ADULT - ASSESSMENT
79 y/o F pt with hx of vertigo, HLD, and breast cancer treated with chemo and radiation 11 years ago presents to the ED c/o dizziness since this morning, upon waking up. Pt states that dizziness aggravated with turning head to the right. Associated with nausea and vomiting, admitted for vertigo, need mri to r/o metastatic process

## 2018-09-24 NOTE — PROGRESS NOTE ADULT - SUBJECTIVE AND OBJECTIVE BOX
Patient is a 78y old  Female who presents with a chief complaint of vertigo (23 Sep 2018 13:55)        HPI:  77 y/o F pt with hx of vertigo, HLD, and breast cancer treated with chemo and radiation 11 years ago presents to the ED c/o dizziness since this morning, upon waking up. Pt states that dizziness aggravated with turning head to the right. Associated with nausea and vomiting. She took a Zofran today, after which she vomitted. Similar sx for which she was seen in the ED a couple weeks ago, treated with Antivert. Pt admits that she has not taken an Antivert today PTA. Denies headache, tinnitus, LOC,  weakness or numbness in extremities. No other complaints at, was not offered TPA as likely to be CVA effect, pt denies any cp, palptaiton, sob, abdominal or urianry changes (23 Sep 2018 13:55)      SUBJECTIVE & OBJECTIVE: Pt seen and examined at bedside , dizzy     PHYSICAL EXAM:  T(C): 36.6 (09-24-18 @ 07:39), Max: 37 (09-23-18 @ 22:00)  HR: 60 (09-24-18 @ 07:39) (60 - 99)  BP: 113/64 (09-24-18 @ 07:39) (108/73 - 155/85)  RR: 16 (09-24-18 @ 07:39) (14 - 19)  SpO2: 98% (09-24-18 @ 07:39) (95% - 100%)  Wt(kg): -- Height (cm): 157.48 (09-23 @ 10:52)  Weight (kg): 64.4 (09-23 @ 10:52)  BMI (kg/m2): 26 (09-23 @ 10:52)  BSA (m2): 1.65 (09-23 @ 10:52)  GENERAL: NAD, well-groomed, well-developed  HEAD:  Atraumatic, Normocephalic  EYES: EOMI, PERRLA, conjunctiva and sclera clear  ENMT: Moist mucous membranes  NECK: Supple, No JVD  NERVOUS SYSTEM:  Alert & Oriented X3,  CHEST/LUNG: Clear to auscultation bilaterally; No rales, rhonchi, wheezing, or rubs  HEART: Regular rate and rhythm; No murmurs, rubs, or gallops  ABDOMEN: Soft, Nontender, Nondistended; Bowel sounds present  EXTREMITIES:  2+ Peripheral Pulses, No clubbing, cyanosis, or edema        MEDICATIONS  (STANDING):  enoxaparin Injectable 40 milliGRAM(s) SubCutaneous daily  meclizine 25 milliGRAM(s) Oral three times a day    MEDICATIONS  (PRN):  oxyCODONE    IR 5 milliGRAM(s) Oral every 4 hours PRN Moderate Pain (4 - 6)      LABS:                        12.4   4.59  )-----------( 223      ( 24 Sep 2018 06:18 )             37.9     09-24    139  |  105  |  16  ----------------------------<  91  3.8   |  28  |  0.61    Ca    8.8      24 Sep 2018 06:18    TPro  6.7  /  Alb  3.3  /  TBili  0.3  /  DBili  x   /  AST  21  /  ALT  26  /  AlkPhos  59  09-24    PT/INR - ( 23 Sep 2018 12:40 )   PT: 11.0 sec;   INR: 1.01 ratio         PTT - ( 23 Sep 2018 12:40 )  PTT:26.8 sec      CAPILLARY BLOOD GLUCOSE          CAPILLARY BLOOD GLUCOSE        CAPILLARY BLOOD GLUCOSE                RECENT CULTURES:      RADIOLOGY & ADDITIONAL TESTS:                        DVT/GI ppx  Discussed with pt @ bedside

## 2018-09-24 NOTE — PHYSICAL THERAPY INITIAL EVALUATION ADULT - PERTINENT HX OF CURRENT PROBLEM, REHAB EVAL
Pt is a 77 yo female presents to the ED c/o dizziness since this morning, upon waking up. Pt states that dizziness aggravated with turning head to the right. Associated with nausea and vomiting.  MRI head: unremarkable

## 2018-09-24 NOTE — PROGRESS NOTE ADULT - SUBJECTIVE AND OBJECTIVE BOX
Neurology Follow up note    YEMI MARTINEZ 78y Female    HPI:  79 y/o F pt with hx of vertigo, HLD, and breast cancer treated with chemo and radiation 11 years ago presents to the ED c/o dizziness since this morning, upon waking up. Pt states that dizziness aggravated with turning head to the right. Associated with nausea and vomiting. She took a Zofran today, after which she vomitted. Similar sx for which she was seen in the ED a couple weeks ago, treated with Antivert. Pt admits that she has not taken an Antivert today PTA. Denies headache, tinnitus, LOC,  weakness or numbness in extremities. No other complaints at, was not offered TPA as likely to be CVA effect, pt denies any cp, palptaiton, sob, abdominal or urianry changes (23 Sep 2018 13:55)    Interval History -    Patient is seen, chart was reviewed and case was discussed with the treatment team.  Pt is not in any distress.   Lying on bed comfortably.       Vital Signs Last 24 Hrs  T(C): 36.6 (24 Sep 2018 07:39), Max: 37 (23 Sep 2018 22:00)  T(F): 97.8 (24 Sep 2018 07:39), Max: 98.6 (23 Sep 2018 22:00)  HR: 60 (24 Sep 2018 07:39) (60 - 99)  BP: 113/64 (24 Sep 2018 07:39) (108/73 - 135/65)  BP(mean): --  RR: 16 (24 Sep 2018 07:39) (14 - 19)  SpO2: 98% (24 Sep 2018 07:39) (95% - 99%)       MEDICATIONS    enoxaparin Injectable 40 milliGRAM(s) SubCutaneous daily  meclizine 25 milliGRAM(s) Oral three times a day  oxyCODONE    IR 5 milliGRAM(s) Oral every 4 hours PRN    Allergies    penicillin (Hives; Rash)      REVIEW OF SYSTEMS:    CONSTITUTIONAL: No fever  EYES: No eye pain,   ENMT:  No sinus or throat pain  NECK: No pain or stiffness  RESPIRATORY: No cough, No hemoptysis; No shortness of breath  CARDIOVASCULAR: No acute chest pain, palpitations,  or leg swelling  GASTROINTESTINAL: No abdominal pain. No nausea, vomiting, or hematemesis;  No melena or hematochezia.  GENITOURINARY: No  hematuria, or incontinence  MUSCULOSKELETAL: No joint swelling; No extremity pain  SKIN: No itching, rashes, or lesions   LYMPH NODES: No enlarged glands  NEUROLOGICAL: No headaches, memory loss,   PSYCHIATRIC: No depression, anxiety, mood swings, or difficulty sleeping  ENDOCRINE: No heat or cold intolerance;   HEME/LYMPH: No easy bruising, or bleeding gums  Allergy/Immunology. No medication allergy. No seasonal allergies.  GYN  -H/o Left breast Ca 11 yrs ago.    PHYSICAL EXAM:  GENERAL: NAD, well-groomed, well-developed  HEAD:  Atraumatic, Normocephalic  EYES: EOMI, PERRLA, conjunctiva and sclera clear  NECK: Supple, No JVD, thyroid non-palpable    On Neurological Examination:    Mental Status - Pt is alert, awake, oriented X3. Higher functions are intact.  Follows commands well and able to answer questions appropriately.    Speech -  Normal. Pt has no aphasia.    Cranial Nerves - Pupils 3 mm equal and reactive to light, extraocular eye movements intact. Pt has no visual field deficit.  Pt has no facial asymmetry. Tongue - is in midline.    Motor Exam - 4 plus/5 all over, No drift. No shaking or tremors.  Muscle tone - is normal all over. Moves all extremities equally. No asymmetry is seen.      Sensory Exam - Pin prick, temperature, joint position and vibration are intact on either side. Pt withdraws all extremities equally on stimulation. No asymmetry seen. No complaints of tingling, numbness.    Gait - Able to stand and walk unassisted.     Deep tendon Reflexes - 2 plus all over.    Coordination - Fine finger movements are normal on both sides. Finger to nose is also normal on both sides.       Romberg - Negative.    Neck Supple -  Yes.    LABS:    CBC Full  -  ( 24 Sep 2018 06:18 )  WBC Count : 4.59 K/uL  Hemoglobin : 12.4 g/dL  Hematocrit : 37.9 %  Platelet Count - Automated : 223 K/uL  Mean Cell Volume : 90.5 fl  Mean Cell Hemoglobin : 29.6 pg  Mean Cell Hemoglobin Concentration : 32.7 gm/dL    09-24    139  |  105  |  16  ----------------------------<  91  3.8   |  28  |  0.61    Ca    8.8      24 Sep 2018 06:18    TPro  6.7  /  Alb  3.3  /  TBili  0.3  /  DBili  x   /  AST  21  /  ALT  26  /  AlkPhos  59  09-24      Radiology  -    < from: MR Head w/wo IV Cont (09.24.18 @ 10:45) >    EXAM:  MR BRAIN WAW IC                            PROCEDURE DATE:  09/24/2018          INTERPRETATION:  MRI brain with and without contrast    History vertigo    Contrast Gadavist 7 cc; 0.5 cc discarded    There is no enhancing intracranial mass lesion. There is no mass effect,   cortical edema or hydrocephalus. Cortical volume and white matter signal   are relatively preserved. There is no evidence of acute infarct or   previous parenchymal hemorrhage. The orbital and sellar contents and   cerebellar tonsils are within normal limits. There is a small incidental   developmental venous anomaly in the right occipital lobe.    IMPRESSION:    Unremarkable      KENNETH HEAD M.D., ATTENDING RADIOLOGIST  This document has been electronically signed. Sep 24 2018 11:01AM        < end of copied text > Neurology Follow up note    YEMI MARTINEZ 78y Female came with certigo    HPI:  79 y/o F pt with hx of vertigo, HLD, and breast cancer treated with chemo and radiation 11 years ago presents to the ED c/o dizziness since this morning, upon waking up. Pt states that dizziness aggravated with turning head to the right. Associated with nausea and vomiting. She took a Zofran today, after which she vomitted. Similar sx for which she was seen in the ED a couple weeks ago, treated with Antivert. Pt admits that she has not taken an Antivert today PTA. Denies headache, tinnitus, LOC,  weakness or numbness in extremities. No other complaints at, was not offered TPA as likely to be CVA effect, pt denies any cp, palptaiton, sob, abdominal or urianry changes (23 Sep 2018 13:55)    Interval History -    Patient is seen, chart was reviewed and case was discussed with the treatment team.  Pt is not in any distress.   Sitting on chair comfortably.       Vital Signs Last 24 Hrs  T(C): 36.6 (24 Sep 2018 07:39), Max: 37 (23 Sep 2018 22:00)  T(F): 97.8 (24 Sep 2018 07:39), Max: 98.6 (23 Sep 2018 22:00)  HR: 60 (24 Sep 2018 07:39) (60 - 99)  BP: 113/64 (24 Sep 2018 07:39) (108/73 - 135/65)  BP(mean): --  RR: 16 (24 Sep 2018 07:39) (14 - 19)  SpO2: 98% (24 Sep 2018 07:39) (95% - 99%)       MEDICATIONS    enoxaparin Injectable 40 milliGRAM(s) SubCutaneous daily  meclizine 25 milliGRAM(s) Oral three times a day  oxyCODONE    IR 5 milliGRAM(s) Oral every 4 hours PRN    Allergies    penicillin (Hives; Rash)      REVIEW OF SYSTEMS:    CONSTITUTIONAL: No fever  EYES: No eye pain,   ENMT:  No sinus or throat pain  NECK: No pain or stiffness  RESPIRATORY: No cough, No hemoptysis; No shortness of breath  CARDIOVASCULAR: No acute chest pain, palpitations,  or leg swelling  GASTROINTESTINAL: No abdominal pain. No nausea, vomiting, or hematemesis;  No melena or hematochezia.  GENITOURINARY: No  hematuria, or incontinence  MUSCULOSKELETAL: No joint swelling; No extremity pain  SKIN: No itching, rashes, or lesions   LYMPH NODES: No enlarged glands  NEUROLOGICAL: No headaches, memory loss,   PSYCHIATRIC: No depression, anxiety, mood swings, or difficulty sleeping  ENDOCRINE: No heat or cold intolerance;   HEME/LYMPH: No easy bruising, or bleeding gums  Allergy/Immunology. No medication allergy. No seasonal allergies.  GYN  - H/o Left breast Ca 11 yrs ago.    PHYSICAL EXAM:  GENERAL: NAD, well-groomed, well-developed  HEAD:  Atraumatic, Normocephalic  EYES: EOMI, PERRLA, conjunctiva and sclera clear  NECK: Supple, No JVD, thyroid non-palpable    On Neurological Examination:    Mental Status - Pt is alert, awake, oriented X3. Higher functions are intact.  Follows commands well and able to answer questions appropriately.    Speech -  Normal. Pt has no aphasia.    Cranial Nerves - Pupils 3 mm equal and reactive to light, extraocular eye movements intact. Pt has no visual field deficit.  Pt has no facial asymmetry. Tongue - is in midline.    Motor Exam - 4 plus/5 all over, No drift. No shaking or tremors.  Muscle tone - is normal all over. Moves all extremities equally. No asymmetry is seen.      Sensory Exam - Pin prick, temperature, joint position and vibration are intact on either side. Pt withdraws all extremities equally on stimulation. No asymmetry seen. No complaints of tingling, numbness.    Gait - Able to stand and walk unassisted.     Deep tendon Reflexes - 2 plus all over.    Coordination - Fine finger movements are normal on both sides. Finger to nose is also normal on both sides.       Romberg - Negative.    Neck Supple -  Yes.    LABS:    CBC Full  -  ( 24 Sep 2018 06:18 )  WBC Count : 4.59 K/uL  Hemoglobin : 12.4 g/dL  Hematocrit : 37.9 %  Platelet Count - Automated : 223 K/uL  Mean Cell Volume : 90.5 fl  Mean Cell Hemoglobin : 29.6 pg  Mean Cell Hemoglobin Concentration : 32.7 gm/dL    09-24    139  |  105  |  16  ----------------------------<  91  3.8   |  28  |  0.61    Ca    8.8      24 Sep 2018 06:18    TPro  6.7  /  Alb  3.3  /  TBili  0.3  /  DBili  x   /  AST  21  /  ALT  26  /  AlkPhos  59  09-24      Radiology  -    < from: MR Head w/wo IV Cont (09.24.18 @ 10:45) >    EXAM:  MR BRAIN WAW IC                            PROCEDURE DATE:  09/24/2018          INTERPRETATION:  MRI brain with and without contrast    History vertigo    Contrast Gadavist 7 cc; 0.5 cc discarded    There is no enhancing intracranial mass lesion. There is no mass effect,   cortical edema or hydrocephalus. Cortical volume and white matter signal   are relatively preserved. There is no evidence of acute infarct or   previous parenchymal hemorrhage. The orbital and sellar contents and   cerebellar tonsils are within normal limits. There is a small incidental   developmental venous anomaly in the right occipital lobe.    IMPRESSION:    Unremarkable      KENNETH HEAD M.D., ATTENDING RADIOLOGIST  This document has been electronically signed. Sep 24 2018 11:01AM        < end of copied text >

## 2018-12-25 ENCOUNTER — EMERGENCY (EMERGENCY)
Facility: HOSPITAL | Age: 78
LOS: 1 days | Discharge: HOME | End: 2018-12-25
Attending: EMERGENCY MEDICINE | Admitting: EMERGENCY MEDICINE
Payer: MEDICARE

## 2018-12-25 VITALS
RESPIRATION RATE: 15 BRPM | DIASTOLIC BLOOD PRESSURE: 83 MMHG | TEMPERATURE: 98 F | HEART RATE: 89 BPM | SYSTOLIC BLOOD PRESSURE: 148 MMHG | OXYGEN SATURATION: 98 %

## 2018-12-25 VITALS
OXYGEN SATURATION: 98 % | HEIGHT: 63 IN | TEMPERATURE: 97 F | HEART RATE: 102 BPM | RESPIRATION RATE: 16 BRPM | WEIGHT: 139.99 LBS | DIASTOLIC BLOOD PRESSURE: 85 MMHG | SYSTOLIC BLOOD PRESSURE: 133 MMHG

## 2018-12-25 DIAGNOSIS — Z98.89 OTHER SPECIFIED POSTPROCEDURAL STATES: Chronic | ICD-10-CM

## 2018-12-25 DIAGNOSIS — Z90.710 ACQUIRED ABSENCE OF BOTH CERVIX AND UTERUS: Chronic | ICD-10-CM

## 2018-12-25 PROCEDURE — 71045 X-RAY EXAM CHEST 1 VIEW: CPT

## 2018-12-25 PROCEDURE — 99283 EMERGENCY DEPT VISIT LOW MDM: CPT | Mod: 25

## 2018-12-25 PROCEDURE — 96372 THER/PROPH/DIAG INJ SC/IM: CPT

## 2018-12-25 PROCEDURE — 71045 X-RAY EXAM CHEST 1 VIEW: CPT | Mod: 26

## 2018-12-25 PROCEDURE — 99283 EMERGENCY DEPT VISIT LOW MDM: CPT

## 2018-12-25 RX ORDER — OXYCODONE AND ACETAMINOPHEN 5; 325 MG/1; MG/1
1 TABLET ORAL ONCE
Qty: 0 | Refills: 0 | Status: DISCONTINUED | OUTPATIENT
Start: 2018-12-25 | End: 2018-12-25

## 2018-12-25 RX ORDER — METHOCARBAMOL 500 MG/1
2 TABLET, FILM COATED ORAL
Qty: 30 | Refills: 0 | OUTPATIENT
Start: 2018-12-25 | End: 2018-12-29

## 2018-12-25 RX ORDER — IBUPROFEN 200 MG
1 TABLET ORAL
Qty: 20 | Refills: 0 | OUTPATIENT
Start: 2018-12-25 | End: 2018-12-29

## 2018-12-25 RX ORDER — ACETAMINOPHEN 500 MG
650 TABLET ORAL ONCE
Qty: 0 | Refills: 0 | Status: COMPLETED | OUTPATIENT
Start: 2018-12-25 | End: 2018-12-25

## 2018-12-25 RX ORDER — DIAZEPAM 5 MG
5 TABLET ORAL ONCE
Qty: 0 | Refills: 0 | Status: DISCONTINUED | OUTPATIENT
Start: 2018-12-25 | End: 2018-12-25

## 2018-12-25 RX ORDER — METHOCARBAMOL 500 MG/1
1000 TABLET, FILM COATED ORAL ONCE
Qty: 0 | Refills: 0 | Status: COMPLETED | OUTPATIENT
Start: 2018-12-25 | End: 2018-12-25

## 2018-12-25 RX ORDER — ATORVASTATIN CALCIUM 80 MG/1
0 TABLET, FILM COATED ORAL
Qty: 0 | Refills: 0 | COMMUNITY

## 2018-12-25 RX ORDER — KETOROLAC TROMETHAMINE 30 MG/ML
30 SYRINGE (ML) INJECTION ONCE
Qty: 0 | Refills: 0 | Status: DISCONTINUED | OUTPATIENT
Start: 2018-12-25 | End: 2018-12-25

## 2018-12-25 RX ADMIN — OXYCODONE AND ACETAMINOPHEN 1 TABLET(S): 5; 325 TABLET ORAL at 16:04

## 2018-12-25 RX ADMIN — OXYCODONE AND ACETAMINOPHEN 1 TABLET(S): 5; 325 TABLET ORAL at 15:20

## 2018-12-25 RX ADMIN — METHOCARBAMOL 1000 MILLIGRAM(S): 500 TABLET, FILM COATED ORAL at 13:42

## 2018-12-25 RX ADMIN — Medication 650 MILLIGRAM(S): at 14:34

## 2018-12-25 RX ADMIN — Medication 30 MILLIGRAM(S): at 13:42

## 2018-12-25 RX ADMIN — Medication 30 MILLIGRAM(S): at 13:48

## 2018-12-25 RX ADMIN — Medication 650 MILLIGRAM(S): at 14:27

## 2018-12-25 RX ADMIN — Medication 5 MILLIGRAM(S): at 14:27

## 2018-12-25 NOTE — ED PROVIDER NOTE - NSFOLLOWUPINSTRUCTIONS_ED_ALL_ED_FT
1. FOLLOW UP WITH YOUR PRIMARY DOCTOR IN 24-48 HOURS.   2. FOLLOW UP WITH ALL SPECALIST DISCUSSED DURING YOUR VISIT.   3. TAKE ALL MEDICATIONS PRESCRIBED IN THE ER.   4. RETURN FOR WORSENING SYMPTOMS INCLUDING BUT NOT LIMITED TO FEVER, CHEST PAIN, OR TROUBLE BREATHING.  5. take ibuprofen 600mg every 6 hours for pain or tylenol 650mg every 6 hours  6. if pain is not resolved. add robaxin as prescribed. do not drive when taking  7. use warm moist heat on back.

## 2018-12-25 NOTE — ED PROVIDER NOTE - CHPI ED SYMPTOMS NEG
no motor function loss/no tingling/no numbness/no bladder dysfunction/no difficulty bearing weight/no bowel dysfunction

## 2018-12-25 NOTE — ED PROVIDER NOTE - CARE PROVIDER_API CALL
Homar Smith), Orthopaedic Surgery  [address] [place] [address]  Phone: [phone]  Fax: [phone]

## 2018-12-25 NOTE — ED PROVIDER NOTE - OBJECTIVE STATEMENT
pt is a 77yo female with pmhx of breast cancer c/o muscle spasm x yesterday. pt reports pain started as left upper back spasm and eventually moved to the right side mid-back. pt reports she went to [employer] and picked up heavy items, associating that with pain. pt reports she had previous episodes, seen by spine doctor and was rx robaxin and meloxicam. pt reports she took medications with minimal relief.

## 2018-12-25 NOTE — ED ADULT NURSE NOTE - ED STAT RN HANDOFF DETAILS
Endorse pt to next shift RN. Awaiting reevaluation and disposition. Pt is sleeping at the moment. No distress.

## 2018-12-25 NOTE — ED PROVIDER NOTE - PHYSICAL EXAMINATION
Spine Exam:     Cervical: No erythema, ecchymosis, or visible deformity. No midline tenderness or step-off appreciated on palpation. No paravertebral tenderness. No muscle spasm. FROM. NEG NEXUS criteria.          Thoracic: No erythema, ecchymosis, or visible deformity. No midline tenderness or step-off appreciated on palpation. + R paravertebral tenderness. + R  muscle spasm.           Lumbosacral: No erythema, ecchymosis, or visible deformity. No midline tenderness or step-off appreciated on palpation. No paravertebral tenderness. No muscle spasm.  FROM BL UE AND BLLE. GAIT INTACT. SENSATION INTACT.

## 2018-12-25 NOTE — ED PROVIDER NOTE - PROGRESS NOTE DETAILS
Evelyn Bazzi for Dr Chidi Ritchie: 79 yo F with hx of spine pain, seen by Dr Parr recently for eval. Had negative Xrays. c/o repeat back pain after twisting her body while lifting heavy object two days ago. Took some pills had remaining at home with no improvement. Denies bladder/bowel dysfunction or other injuries/sxs.  PE: non tender spine, FROM, no deformity, extremities normal, neuro normal.  Plan: pain meds, muscle relaxer, and f/u with ortho tomorrow. pt still in pain will give oxycodone and do cxr. cxr reviewed. pt advised she must follow up with spine . Discussed with Patient and/or Family regarding the X-ray findings.  Discussed the risks of occult/secondary fracture or ligamentous/tendon injury. Discussed the importance of close prompt follow-up with Orthopedics for definitive workup and treatment.  Also discussed injury/neurologic precautions.  Verbalization of understanding of all instructions was shown and an opportunity was given for questions. All imaging and labs reviewed. all results reviewed with pt including abnormal results. pt given a copy of results. pt advised to follow up with pmd regarding abnormal results. All questions answered and concerns addressed. pt verbalized understanding and agreement with plan and dx. pt advised on next step and when/where to follow up. pt advised on all take home and otc medications. pt advised to follow up with PMD. pt advised to return to ed for worsening symptoms including fever, cp, sob. will dc. cxr reviewed. pt advised she must follow up with spine . Discussed with Patient and/or Family regarding the X-ray findings.  Discussed the risks of occult/secondary fracture or ligamentous/tendon injury. Discussed the importance of close prompt follow-up with Orthopedics for definitive workup and treatment.  Also discussed injury/neurologic precautions.  Verbalization of understanding of all instructions was shown and an opportunity was given for questions. All imaging and labs reviewed. all results reviewed with pt including abnormal results. pt given a copy of results. pt advised to follow up with pmd regarding abnormal results. All questions answered and concerns addressed. pt verbalized understanding and agreement with plan and dx. pt advised on next step and when/where to follow up. pt advised on all take home and otc medications. pt advised to follow up with PMD. pt advised to return to ed for worsening symptoms including fever, cp, sob. will dc. willk rx robaxin and ibuprofen pt sitting up in bed eating cookies without issue.. pt improved. cxr reviewed. pt advised she must follow up with spine . Discussed with Patient and/or Family regarding the X-ray findings.  Discussed the risks of occult/secondary fracture or ligamentous/tendon injury. Discussed the importance of close prompt follow-up with Orthopedics for definitive workup and treatment.  Also discussed injury/neurologic precautions.  Verbalization of understanding of all instructions was shown and an opportunity was given for questions. All imaging and labs reviewed. all results reviewed with pt including abnormal results. pt given a copy of results. pt advised to follow up with pmd regarding abnormal results. All questions answered and concerns addressed. pt verbalized understanding and agreement with plan and dx. pt advised on next step and when/where to follow up. pt advised on all take home and otc medications. pt advised to follow up with PMD. pt advised to return to ed for worsening symptoms including fever, cp, sob. will dc. willk rx robaxin and ibuprofen

## 2018-12-25 NOTE — ED PROVIDER NOTE - MUSCULOSKELETAL, MLM
-check blood cultures x 2 sets  -check a urine culture  -check a Streptococcus pneumoniae urinary antigen  -check a Legionella urinary antigen  -the patient received IV vancomycin, IV cefepime, and IV clindamycin in the emergency department  -I will have the patient on IV levofloxacin and IV metronidazole to cover for community-acquired pneumonia pathogens and aspiration pneumonia pathogens see above

## 2018-12-25 NOTE — ED PROVIDER NOTE - NSFOLLOWUPCLINICS_GEN_ALL_ED_FT
[facility] [place]  Neurosurgery/Spine  [address], [address]  [place] [address]  Phone: [phone]  Fax:   Follow Up Time:     [facility] - [facility]  Neurosurgery/Spine  [address] [place] [id number]  Phone: [phone]  Fax:   Follow Up Time:

## 2019-01-02 ENCOUNTER — APPOINTMENT (OUTPATIENT)
Dept: ORTHOPEDIC SURGERY | Facility: CLINIC | Age: 79
End: 2019-01-02
Payer: MEDICARE

## 2019-01-02 VITALS
DIASTOLIC BLOOD PRESSURE: 83 MMHG | HEART RATE: 99 BPM | WEIGHT: 144 LBS | BODY MASS INDEX: 26.5 KG/M2 | SYSTOLIC BLOOD PRESSURE: 140 MMHG | HEIGHT: 62 IN

## 2019-01-02 PROCEDURE — 99214 OFFICE O/P EST MOD 30 MIN: CPT

## 2019-01-02 PROCEDURE — 72070 X-RAY EXAM THORAC SPINE 2VWS: CPT

## 2019-01-02 RX ORDER — MELOXICAM 15 MG/1
15 TABLET ORAL
Qty: 30 | Refills: 2 | Status: DISCONTINUED | COMMUNITY
Start: 2017-11-08 | End: 2019-01-02

## 2019-06-20 ENCOUNTER — CHART COPY (OUTPATIENT)
Age: 79
End: 2019-06-20

## 2020-01-06 ENCOUNTER — APPOINTMENT (OUTPATIENT)
Dept: MRI IMAGING | Facility: CLINIC | Age: 80
End: 2020-01-06
Payer: MEDICARE

## 2020-01-06 ENCOUNTER — OUTPATIENT (OUTPATIENT)
Dept: OUTPATIENT SERVICES | Facility: HOSPITAL | Age: 80
LOS: 1 days | End: 2020-01-06
Payer: MEDICARE

## 2020-01-06 DIAGNOSIS — Z90.710 ACQUIRED ABSENCE OF BOTH CERVIX AND UTERUS: Chronic | ICD-10-CM

## 2020-01-06 DIAGNOSIS — Z98.89 OTHER SPECIFIED POSTPROCEDURAL STATES: Chronic | ICD-10-CM

## 2020-01-06 DIAGNOSIS — Z00.8 ENCOUNTER FOR OTHER GENERAL EXAMINATION: ICD-10-CM

## 2020-01-06 PROCEDURE — 72146 MRI CHEST SPINE W/O DYE: CPT | Mod: 26

## 2020-01-06 PROCEDURE — 72146 MRI CHEST SPINE W/O DYE: CPT

## 2020-01-08 ENCOUNTER — APPOINTMENT (OUTPATIENT)
Dept: ORTHOPEDIC SURGERY | Facility: CLINIC | Age: 80
End: 2020-01-08
Payer: MEDICARE

## 2020-01-08 ENCOUNTER — CHART COPY (OUTPATIENT)
Age: 80
End: 2020-01-08

## 2020-01-08 VITALS — HEIGHT: 62 IN | HEART RATE: 87 BPM | DIASTOLIC BLOOD PRESSURE: 75 MMHG | SYSTOLIC BLOOD PRESSURE: 143 MMHG

## 2020-01-08 DIAGNOSIS — M54.6 PAIN IN THORACIC SPINE: ICD-10-CM

## 2020-01-08 DIAGNOSIS — M54.5 LOW BACK PAIN: ICD-10-CM

## 2020-01-08 PROCEDURE — 99214 OFFICE O/P EST MOD 30 MIN: CPT | Mod: 25

## 2020-01-08 PROCEDURE — 96372 THER/PROPH/DIAG INJ SC/IM: CPT

## 2020-01-08 RX ORDER — KETOROLAC TROMETHAMINE 30 MG/ML
30 INJECTION, SOLUTION INTRAMUSCULAR; INTRAVENOUS
Qty: 1 | Refills: 0 | Status: COMPLETED | OUTPATIENT
Start: 2020-01-08

## 2020-01-08 RX ORDER — DICLOFENAC SODIUM 75 MG/1
75 TABLET, DELAYED RELEASE ORAL
Qty: 1 | Refills: 1 | Status: ACTIVE | COMMUNITY
Start: 2020-01-08 | End: 1900-01-01

## 2020-01-08 RX ORDER — METHOCARBAMOL 500 MG/1
500 TABLET, FILM COATED ORAL 3 TIMES DAILY
Qty: 60 | Refills: 0 | Status: ACTIVE | COMMUNITY
Start: 2017-11-08 | End: 1900-01-01

## 2020-01-08 RX ADMIN — KETOROLAC TROMETHAMINE 0 MG/ML: 30 INJECTION, SOLUTION INTRAMUSCULAR; INTRAVENOUS at 00:00

## 2020-01-08 NOTE — PHYSICAL EXAM
[Full] : is full [Normal] : normal [LE] : Sensory: Intact in bilateral lower extremities [Knee] : patellar 2+ and symmetric bilaterally [DP] : dorsalis pedis 2+ and symmetric bilaterally [Ankle] : ankle 2+ and symmetric bilaterally [PT] : posterior tibial 2+ and symmetric bilaterally [Poor Appearance] : well-appearing [de-identified] : EXAM: MR SPINE THORACIC \par \par PROCEDURE DATE: 01/06/2020 \par \par INTERPRETATION: MRI of thoracic spine without contrast \par \par History acute right-sided back pain \par \par There is slightly exaggerated thoracic kyphosis without compression fracture \par or subluxation or marrow infiltration or edema. The spinal canal is \par congenitally capacious. The spinal cord is normal in signal and contour. \par There is no epidural mass or collection. Moderately severe degenerative disc \par change and mild broad dorsal osteophyte involves the T12-L1 level mildly \par deforming the ventral thecal sac. There is moderate degenerative disc space \par narrowing without dorsal osteophyte in the upper to mid thoracic levels. \par There is no spinal stenosis. \par \par IMPRESSION: \par \par Kyphosis and spondylosis, typical for age without spinal canal compromise \par \par KENNETH HEAD M.D., ATTENDING RADIOLOGIST \par This document has been electronically signed. Jan 7 2020 12:03PM  [Abl Mood] : in a normal mood [Obese] : not obese [Abl Affect] : with normal affect [Acute Distress] : not in acute distress [Poor Coordination] : normal coordination [Painful] : not painful [Disorientation] : oriented x 3 [de-identified] : She can forward flex to her ankles and extend to 30° without pain. [FreeTextEntry2] : The pt is awake, alert and oriented to self, place and time, is comfortable and in no acute distress. Gait examination reveals a narrow based, non-ataxic, non-antalgic gait. Can heel and toe walk without difficulty. Inspection of neck, back and lower extremities bilaterally reveals no rashes or ecchymotic lesions.  There is no obvious abnormal spinal curvature in the sagittal and coronal planes with a slightly stooped forward posture noted. There is no tenderness over the cervical, thoracic or lumbar spine, or the upper and lower extremities musculature. No paraspinal lumbar tenderness. There is no sacroiliac tenderness. No greater trochanteric tenderness bilaterally. No atrophy or abnormal movements noted in the upper or lower extremities. There is no swelling noted in the upper or lower extremities bilaterally. No cervical lymphadenopathy noted anteriorly. No joint laxity noted in the upper and lower extremity joints bilaterally.\par  Hip range of motion is degrees internal rotation 30° external rotation without pain. Full range of motion of the shoulders bilaterally with no significant pain\par Negative straight leg raise to 45° in the sitting position bilaterally. There is no groin pain with hip internal rotation and a negative JULIAN test bilaterally.  [SLR] : negative straight leg raise [de-identified] : seen with her

## 2020-01-08 NOTE — DISCUSSION/SUMMARY
[Medication Risks Reviewed] : Medication risks reviewed [de-identified] : The patient reports acute onset of left-sided low back pain after cleaning the garage on Sunday 3 days ago. She tried meloxicam without relief. She has been prescribed diclofenac by the nurse practitioner but has not taken it today. For acute pain offered her an injection of Toradol and she wanted to proceed. Under sterile conditions 30 mg of Toradol was administered intramuscularly by the LPN without incident. Prescription of methocarbamol was also provided. Recommended follow up in 2 weeks if her symptoms do not improve. Recommended use of a lumbar corset over the next few days as well.\par \par The patient was educated regarding their condition, treatment options as well as prescribed course of treatment. \par Risks and benefits as well as alternatives to the proposed treatment were also provided to the patient \par They were given the opportunity to have all their questions answered to their satisfaction.\par \par Vital signs were reviewed with the patient and the patient was instructed to followup with their primary care provider for further management.\par \par Healthy lifestyle recommendations were also made including a tobacco free lifestyle, proper diet, and weight control.

## 2020-01-08 NOTE — HISTORY OF PRESENT ILLNESS
[Stable] : stable [None] : No relieving factors are noted [Daily] : ~He/She~ states the symptoms seem to be occuring daily [de-identified] : spasms daily [de-identified] : Patient is here today to review mri thoracic spine 1/6/20. Cleaning the garage on sunday 1/5/20. Started getting spasms left lower back. Feels similar to right sided back pain she has had in the past. \par took meloxicam/advil since sunday without relief.

## 2020-01-16 ENCOUNTER — EMERGENCY (EMERGENCY)
Facility: HOSPITAL | Age: 80
LOS: 1 days | Discharge: HOME | End: 2020-01-16
Attending: EMERGENCY MEDICINE | Admitting: EMERGENCY MEDICINE
Payer: MEDICARE

## 2020-01-16 VITALS
OXYGEN SATURATION: 98 % | DIASTOLIC BLOOD PRESSURE: 75 MMHG | WEIGHT: 119.93 LBS | HEIGHT: 62 IN | RESPIRATION RATE: 18 BRPM | HEART RATE: 111 BPM | SYSTOLIC BLOOD PRESSURE: 152 MMHG | TEMPERATURE: 99 F

## 2020-01-16 VITALS
TEMPERATURE: 98 F | HEART RATE: 100 BPM | OXYGEN SATURATION: 96 % | DIASTOLIC BLOOD PRESSURE: 68 MMHG | SYSTOLIC BLOOD PRESSURE: 132 MMHG | RESPIRATION RATE: 17 BRPM

## 2020-01-16 DIAGNOSIS — Z98.89 OTHER SPECIFIED POSTPROCEDURAL STATES: Chronic | ICD-10-CM

## 2020-01-16 DIAGNOSIS — Z90.710 ACQUIRED ABSENCE OF BOTH CERVIX AND UTERUS: Chronic | ICD-10-CM

## 2020-01-16 LAB
ALBUMIN SERPL ELPH-MCNC: 3.6 G/DL — SIGNIFICANT CHANGE UP (ref 3.3–5)
ALP SERPL-CCNC: 57 U/L — SIGNIFICANT CHANGE UP (ref 30–120)
ALT FLD-CCNC: 26 U/L DA — SIGNIFICANT CHANGE UP (ref 10–60)
ANION GAP SERPL CALC-SCNC: 7 MMOL/L — SIGNIFICANT CHANGE UP (ref 5–17)
APPEARANCE UR: CLEAR — SIGNIFICANT CHANGE UP
APTT BLD: 27.1 SEC — LOW (ref 28.5–37)
AST SERPL-CCNC: 22 U/L — SIGNIFICANT CHANGE UP (ref 10–40)
BASOPHILS # BLD AUTO: 0 K/UL — SIGNIFICANT CHANGE UP (ref 0–0.2)
BASOPHILS NFR BLD AUTO: 0 % — SIGNIFICANT CHANGE UP (ref 0–2)
BILIRUB SERPL-MCNC: 0.5 MG/DL — SIGNIFICANT CHANGE UP (ref 0.2–1.2)
BILIRUB UR-MCNC: NEGATIVE — SIGNIFICANT CHANGE UP
BUN SERPL-MCNC: 20 MG/DL — SIGNIFICANT CHANGE UP (ref 7–23)
CALCIUM SERPL-MCNC: 8.8 MG/DL — SIGNIFICANT CHANGE UP (ref 8.4–10.5)
CHLORIDE SERPL-SCNC: 102 MMOL/L — SIGNIFICANT CHANGE UP (ref 96–108)
CO2 SERPL-SCNC: 30 MMOL/L — SIGNIFICANT CHANGE UP (ref 22–31)
COLOR SPEC: YELLOW — SIGNIFICANT CHANGE UP
CREAT SERPL-MCNC: 0.7 MG/DL — SIGNIFICANT CHANGE UP (ref 0.5–1.3)
DIFF PNL FLD: NEGATIVE — SIGNIFICANT CHANGE UP
EOSINOPHIL # BLD AUTO: 0.03 K/UL — SIGNIFICANT CHANGE UP (ref 0–0.5)
EOSINOPHIL NFR BLD AUTO: 0.6 % — SIGNIFICANT CHANGE UP (ref 0–6)
FLU A RESULT: SIGNIFICANT CHANGE UP
FLU A RESULT: SIGNIFICANT CHANGE UP
FLUAV AG NPH QL: SIGNIFICANT CHANGE UP
FLUBV AG NPH QL: SIGNIFICANT CHANGE UP
GLUCOSE SERPL-MCNC: 117 MG/DL — HIGH (ref 70–99)
GLUCOSE UR QL: NEGATIVE MG/DL — SIGNIFICANT CHANGE UP
HCT VFR BLD CALC: 43.6 % — SIGNIFICANT CHANGE UP (ref 34.5–45)
HGB BLD-MCNC: 14.3 G/DL — SIGNIFICANT CHANGE UP (ref 11.5–15.5)
IMM GRANULOCYTES NFR BLD AUTO: 0.2 % — SIGNIFICANT CHANGE UP (ref 0–1.5)
INR BLD: 1.06 RATIO — SIGNIFICANT CHANGE UP (ref 0.88–1.16)
KETONES UR-MCNC: ABNORMAL
LACTATE SERPL-SCNC: 0.8 MMOL/L — SIGNIFICANT CHANGE UP (ref 0.7–2)
LEUKOCYTE ESTERASE UR-ACNC: NEGATIVE — SIGNIFICANT CHANGE UP
LIDOCAIN IGE QN: 174 U/L — SIGNIFICANT CHANGE UP (ref 73–393)
LYMPHOCYTES # BLD AUTO: 0.25 K/UL — LOW (ref 1–3.3)
LYMPHOCYTES # BLD AUTO: 4.7 % — LOW (ref 13–44)
MCHC RBC-ENTMCNC: 29.5 PG — SIGNIFICANT CHANGE UP (ref 27–34)
MCHC RBC-ENTMCNC: 32.8 GM/DL — SIGNIFICANT CHANGE UP (ref 32–36)
MCV RBC AUTO: 89.9 FL — SIGNIFICANT CHANGE UP (ref 80–100)
MONOCYTES # BLD AUTO: 0.24 K/UL — SIGNIFICANT CHANGE UP (ref 0–0.9)
MONOCYTES NFR BLD AUTO: 4.5 % — SIGNIFICANT CHANGE UP (ref 2–14)
NEUTROPHILS # BLD AUTO: 4.82 K/UL — SIGNIFICANT CHANGE UP (ref 1.8–7.4)
NEUTROPHILS NFR BLD AUTO: 90 % — HIGH (ref 43–77)
NITRITE UR-MCNC: NEGATIVE — SIGNIFICANT CHANGE UP
NRBC # BLD: 0 /100 WBCS — SIGNIFICANT CHANGE UP (ref 0–0)
PH UR: 7 — SIGNIFICANT CHANGE UP (ref 5–8)
PLATELET # BLD AUTO: 217 K/UL — SIGNIFICANT CHANGE UP (ref 150–400)
POTASSIUM SERPL-MCNC: 3.3 MMOL/L — LOW (ref 3.5–5.3)
POTASSIUM SERPL-SCNC: 3.3 MMOL/L — LOW (ref 3.5–5.3)
PROT SERPL-MCNC: 7.5 G/DL — SIGNIFICANT CHANGE UP (ref 6–8.3)
PROT UR-MCNC: NEGATIVE MG/DL — SIGNIFICANT CHANGE UP
PROTHROM AB SERPL-ACNC: 11.8 SEC — SIGNIFICANT CHANGE UP (ref 10–12.9)
RBC # BLD: 4.85 M/UL — SIGNIFICANT CHANGE UP (ref 3.8–5.2)
RBC # FLD: 12.8 % — SIGNIFICANT CHANGE UP (ref 10.3–14.5)
RSV RESULT: SIGNIFICANT CHANGE UP
RSV RNA RESP QL NAA+PROBE: SIGNIFICANT CHANGE UP
SODIUM SERPL-SCNC: 139 MMOL/L — SIGNIFICANT CHANGE UP (ref 135–145)
SP GR SPEC: 1 — LOW (ref 1.01–1.02)
TROPONIN I SERPL-MCNC: 0 NG/ML — LOW (ref 0.02–0.06)
UROBILINOGEN FLD QL: NEGATIVE MG/DL — SIGNIFICANT CHANGE UP
WBC # BLD: 5.35 K/UL — SIGNIFICANT CHANGE UP (ref 3.8–10.5)
WBC # FLD AUTO: 5.35 K/UL — SIGNIFICANT CHANGE UP (ref 3.8–10.5)

## 2020-01-16 PROCEDURE — 87631 RESP VIRUS 3-5 TARGETS: CPT

## 2020-01-16 PROCEDURE — 96361 HYDRATE IV INFUSION ADD-ON: CPT

## 2020-01-16 PROCEDURE — 99284 EMERGENCY DEPT VISIT MOD MDM: CPT

## 2020-01-16 PROCEDURE — 85730 THROMBOPLASTIN TIME PARTIAL: CPT

## 2020-01-16 PROCEDURE — 93010 ELECTROCARDIOGRAM REPORT: CPT

## 2020-01-16 PROCEDURE — 83605 ASSAY OF LACTIC ACID: CPT

## 2020-01-16 PROCEDURE — 80053 COMPREHEN METABOLIC PANEL: CPT

## 2020-01-16 PROCEDURE — 96374 THER/PROPH/DIAG INJ IV PUSH: CPT

## 2020-01-16 PROCEDURE — 36415 COLL VENOUS BLD VENIPUNCTURE: CPT

## 2020-01-16 PROCEDURE — 85610 PROTHROMBIN TIME: CPT

## 2020-01-16 PROCEDURE — 96375 TX/PRO/DX INJ NEW DRUG ADDON: CPT

## 2020-01-16 PROCEDURE — 81003 URINALYSIS AUTO W/O SCOPE: CPT

## 2020-01-16 PROCEDURE — 85027 COMPLETE CBC AUTOMATED: CPT

## 2020-01-16 PROCEDURE — 93005 ELECTROCARDIOGRAM TRACING: CPT

## 2020-01-16 PROCEDURE — 84484 ASSAY OF TROPONIN QUANT: CPT

## 2020-01-16 PROCEDURE — 83690 ASSAY OF LIPASE: CPT

## 2020-01-16 PROCEDURE — 99284 EMERGENCY DEPT VISIT MOD MDM: CPT | Mod: 25

## 2020-01-16 RX ORDER — ONDANSETRON 8 MG/1
4 TABLET, FILM COATED ORAL ONCE
Refills: 0 | Status: COMPLETED | OUTPATIENT
Start: 2020-01-16 | End: 2020-01-16

## 2020-01-16 RX ORDER — SODIUM CHLORIDE 9 MG/ML
1000 INJECTION INTRAMUSCULAR; INTRAVENOUS; SUBCUTANEOUS ONCE
Refills: 0 | Status: COMPLETED | OUTPATIENT
Start: 2020-01-16 | End: 2020-01-16

## 2020-01-16 RX ORDER — POTASSIUM CHLORIDE 20 MEQ
20 PACKET (EA) ORAL DAILY
Refills: 0 | Status: DISCONTINUED | OUTPATIENT
Start: 2020-01-16 | End: 2020-02-03

## 2020-01-16 RX ORDER — ONDANSETRON 8 MG/1
1 TABLET, FILM COATED ORAL
Qty: 9 | Refills: 0
Start: 2020-01-16 | End: 2020-01-18

## 2020-01-16 RX ADMIN — Medication 20 MILLIEQUIVALENT(S): at 21:20

## 2020-01-16 RX ADMIN — SODIUM CHLORIDE 1000 MILLILITER(S): 9 INJECTION INTRAMUSCULAR; INTRAVENOUS; SUBCUTANEOUS at 20:55

## 2020-01-16 RX ADMIN — ONDANSETRON 4 MILLIGRAM(S): 8 TABLET, FILM COATED ORAL at 22:36

## 2020-01-16 RX ADMIN — ONDANSETRON 4 MILLIGRAM(S): 8 TABLET, FILM COATED ORAL at 20:12

## 2020-01-16 RX ADMIN — SODIUM CHLORIDE 1000 MILLILITER(S): 9 INJECTION INTRAMUSCULAR; INTRAVENOUS; SUBCUTANEOUS at 19:55

## 2020-01-16 NOTE — ED PROVIDER NOTE - CLINICAL SUMMARY MEDICAL DECISION MAKING FREE TEXT BOX
78 y/o female presents with fever 101 and vomiting today. no abd pain, no diarrhea. no chest pain or sob. no sick contacts. well appearing, abd soft ntnd. likely viral however given age will get labs including lipase, troponin, IVF, antiemetics, reasses. low threshold to CTAP. - Alicia Stiles PA-C

## 2020-01-16 NOTE — ED PROVIDER NOTE - PROGRESS NOTE DETAILS
tolerated zofran and gingerale, but nausea is returning, will redose and reasses - Alicia Stiles PA-C tolerated potassium and gingerale, but nausea is returning without emesis, will redose and reasses - Alicia Stiles PA-C

## 2020-01-16 NOTE — ED PROVIDER NOTE - PATIENT PORTAL LINK FT
You can access the FollowMyHealth Patient Portal offered by [facility] by registering at the following website: [email or web address]. By joining [employer]’s FollowMyHealth portal, you will also be able to view your health information using other applications (apps) compatible with our system.

## 2020-01-16 NOTE — ED PROVIDER NOTE - OBJECTIVE STATEMENT
78 y/o female hx breast CA remotely, not on any daily medications presents to the ED for nausea and vomiting today, states countless episodes. temp 101 at home. no abdominal pain or diarrhea. last BM this morning normal. no uri sx, urinary complaints. no chest pain or sob. did not eat anything unusual and does not have sick contacts. no recent travel. pshx - hysterectomy

## 2020-01-16 NOTE — ED PROVIDER NOTE - NSFOLLOWUPINSTRUCTIONS_ED_ALL_ED_FT
Follow up with your Primary Care Physician within the next 2-3 days  Bring a copy of your test results with you to your appointment  Continue your current medication regimen  Return to the Emergency Room if you experience new or worsening symptoms   stay hydrated  Take Zofran 3x per day as needed for nausea and vomiting.   Take Tylenol 650mg every 6 hrs as needed for pain.   [place] diet    Nausea / Vomiting    Nausea is the feeling that you have to vomit. As nausea gets worse, it can lead to vomiting. Vomiting puts you at an increased risk for dehydration. Older adults and people with other diseases or a weak immune system are at higher risk for dehydration. Drink clear fluids in small but frequent amounts as tolerated. Eat bland, easy-to-digest foods in small amounts as tolerated.    SEEK IMMEDIATE MEDICAL CARE IF YOU HAVE ANY OF THE FOLLOWING SYMPTOMS: fever, inability to keep sufficient fluids down, black or bloody vomitus, black or bloody stools, lightheadedness/dizziness, chest pain, severe headache, rash, shortness of breath, cold or clammy skin, confusion, pain with urination, or any signs of dehydration.

## 2020-01-16 NOTE — ED ADULT NURSE NOTE - OBJECTIVE STATEMENT
79 yr old female walked into ED accompanied by spouse c/o vomiting and nausea today; pt states she was vomiting every 15 minutes today

## 2020-01-16 NOTE — ED PROVIDER NOTE - ATTENDING CONTRIBUTION TO CARE
78 yo female hx of breast CA c/o nausea/vomiting today, states it feels like her vertigo, took meclizine without much relief.  Denies any dizziness/headache.  Admits to having a fever 101 at home.  No abdominal pain.  No diarrhea.  No cough, uri symptoms.  States this n/v is chronic for her.      Gen: Alert, NAD  Head/eyes: NC/AT, PERRL, EOMI, normal lids/conjunctiva, no scleral icterus  ENT: Bilateral TM WNL, normal hearing, patent oropharynx without erythema/exudate, uvula midline, no peritonsillar abscess, no tongue/uvula swelling  Neck: supple, no tenderness/meningismus/JVD, Trachea midline  Pulm/lung: Bilateral clear BS, normal resp effort, no wheeze/stridor/retractions  CV/heart: RRR, no M/R/G, +2 dist pulses (radial, pedal DP/PT, popliteal)  GI/Abd: soft, NT/ND, +BS, no guarding/rebound tenderness  Musculoskeletal: no edema/erythema/cyanosis, FROM in all extremities, no C/T/L spine ttp  Skin: no rash, no vesicles, no petechaie, no ecchymosis, no swelling  Neuro: AAOx3, CN 2-12 intact, normal sensation, 5/5 motor strength in all extremities, normal gait, no dysmetria     labs wnl, symptoms improved with zofran, IV fluids given, tolerating PO here in ED

## 2020-01-16 NOTE — ED ADULT NURSE NOTE - CHPI ED NUR SYMPTOMS NEG
no chills/no diarrhea/no hematuria/no dysuria/no abdominal distension/no fever/no blood in stool/no burning urination

## 2020-08-23 NOTE — PATIENT PROFILE ADULT. - CONTRAINDICATIONS & PRECAUTIONS (SELECT ALL THAT APPLY)
Airway  Performed by: Patric Acuna MD  Authorized by: Patric Acuna MD     Final Airway Type:  Endotracheal airway  Final Endotracheal Airway*:  ETT  ETT Size (mm)*:  7.0  Cuff*:  Regular  Technique Used for Successful ETT Placement:  Video laryngoscopy  Devices/Methods Used in Placement*:  Mask, Oral ETT and Oral Airway  Intubation Procedure*:  Preoxygenation, ETCO2, Atraumatic, Dentition Unchanged, Phaynx Clear and Rapid Sequence Intubation  Insertion Site:  Oral  Blade Type*:  Video Laryngoscope  Blade Size*:  3  Secured at (cm)*:  22  Placement Verified by: auscultation and capnometry    Attempts*:  1  Location:  OR  Urgency:  Elective  Difficult Airway: No    Indications for Airway Management:  Anesthesia  Mask Difficulty Assessment:  1 - vent by mask  Performed By:  Anesthesiologist  Anesthesiologist:  Patric Acuna MD         Patient/surrogate refused vaccine...

## 2021-11-06 NOTE — ED ADULT NURSE NOTE - PRIMARY CARE PROVIDER
"Speech Language Pathology  Daily Treatment     Patient Name: Zachary Moore  Age:  73 y.o., Sex:  male  Medical Record #: [id number]  Today's Date: 11/6/2021     Precautions  Precautions: Fall Risk, Toe Touch Weight Bearing Right Lower Extremity, Swallow Precautions ( See Comments)  Comments: 10/13 R femur ORIF done [place], was in hospital or at SNF since then.    Assessment  Patient seen this date for re-assessment of swallow function. Patient NPO per SLP recommendations from previous assessment. Patient lethargic with wife present at bedside. Patient able to rouse with significant effort from staff. Able to participate in simple conversational speech only. He consumed ice chips, thin liquids, mildly thick liquids, liquidized textures and puree textures with 1:1 assistance from SLP. Observed delayed swallow initiation across all textures with mild oral holding observed with large cup sip of thin liquids, requiring verbal/tactile cues to swallow. No overt s/s of aspiration. However, intermittent throat clearing observed with/without PO and mild cough observed without PO intake. Wife endorsed patient has post nasal drip in AMs with phlegm and expectoration at baseline.   Patient endorsed chest pain with increased PO intake, most significantly following trials of puree textures (endorsing level 5/10 pain). Confirmed less pain with thin liquids and liquidized textures. Wife stated that patient has been prescribed Omeprazole by GI x2 a day, but he has not been taking this during this admission (to her knowledge). Patient nodded \"Yes\" when asked if his pain felt like reflux.   At this time, recommend initiate conservative PO diet of thin liquids, SINGLE SIPS, with liquidized textures. 1:1 feeding assistance. PO only when awake/alert. No independent eating at bedside.  Recommend crushed medication in applesauce.   SLP spoke with MD regarding diet recommendations and patient's history with Omeprazole.       Plan  1) " Advance diet to thin liquids, liquidized textures, 1:1 feeding assist  2) Medication crushed in liquidized textures  3) SLP to continue to follow    Continue current treatment plan.    Discharge Recommendations: Recommend post-acute placement for additional speech therapy services prior to discharge home    Subjective  Patient seen this date for reassessment of swallow function.     Objective       11/06/21 8809   Charge Group   SLP Swallowing Dysfunction Treatment Swallowing Dysfunction Treatment   Dysphagia    Dysphagia X   Other Treatments Trials included ice chips, thin liquids, mildly thick liquids, liquidized textures, puree textures   Diet / Liquid Recommendation Thin (0);Liquidised (3) - (Nectar Thick Full Liquid)   Nutritional Liquid Intake Rating Scale Non thickened beverages   Nutritional Food Intake Rating Scale Total oral diet of a single consistency   Nursing Communication Swallow Precaution Sign Posted at Head of Bed   Skilled Intervention Compensatory Strategies   Recommended Route of Medication Administration   Medication Administration  Crush all Medications in Puree   Other Treatments   Other Treatments Provided Family training   Short Term Goals   Short Term Goal # 1 Pt will consume PO trials during reassessment of swallow with min to no s/s or complaints of esophageal difficulty   Goal Outcome # 1 Progressing slower than expected   Short Term Goal # 2 Pt will consume diet of thin liquids and liquidized textures without overt s/s of aspiration    Education Group   Education Provided Dysphagia;Medications / Pain Control;Role of Speech Therapy   Dysphagia Patient Response Patient;Family;Acceptance;Demonstration;Action Demonstration;Reinforcement Needed   Meds / Pain Control Patient Response Patient;Family;Acceptance;Demonstration;Action Demonstration;Reinforcement Needed   Role of SLP Patient Response Patient;Family;Acceptance;Demonstration;Action Demonstration;Reinforcement Needed   Anticipated  Discharge Needs   Discharge Recommendations Recommend post-acute placement for additional speech therapy services prior to discharge home   Therapy Recommendations Upon DC Dysphagia Training;Community Re-Integration;Patient / Family / Caregiver Education   Interdisciplinary Plan of Care Collaboration   IDT Collaboration with  Nursing;Physician   Patient Position at End of Therapy In Bed;Call Light within Reach;Tray Table within Reach;Family / Friend in Room   Collaboration Comments RN/MD updated with results and recs          Dr. Jaswinder Peters / Dr. Chinchilla

## 2022-01-17 ENCOUNTER — APPOINTMENT (OUTPATIENT)
Dept: ORTHOPEDIC SURGERY | Facility: CLINIC | Age: 82
End: 2022-01-17

## 2022-03-23 ENCOUNTER — NON-APPOINTMENT (OUTPATIENT)
Age: 82
End: 2022-03-23

## 2022-03-23 ENCOUNTER — APPOINTMENT (OUTPATIENT)
Dept: ORTHOPEDIC SURGERY | Facility: CLINIC | Age: 82
End: 2022-03-23
Payer: MEDICARE

## 2022-03-23 VITALS
BODY MASS INDEX: 25.76 KG/M2 | HEIGHT: 62 IN | SYSTOLIC BLOOD PRESSURE: 161 MMHG | WEIGHT: 140 LBS | DIASTOLIC BLOOD PRESSURE: 82 MMHG | HEART RATE: 93 BPM

## 2022-03-23 PROCEDURE — 72110 X-RAY EXAM L-2 SPINE 4/>VWS: CPT

## 2022-03-23 PROCEDURE — 99214 OFFICE O/P EST MOD 30 MIN: CPT

## 2022-03-23 PROCEDURE — 72170 X-RAY EXAM OF PELVIS: CPT | Mod: 59

## 2022-03-23 NOTE — HISTORY OF PRESENT ILLNESS
[8] : a current pain level of 8/10 [Walking] : walking [Daily] : ~He/She~ states the symptoms seem to be occuring daily [Prolonged Standing] : worsened by prolonged standing [Standing] : worsened by standing [Rest] : relieved by rest [Stable] : stable [___ mths] : [unfilled] month(s) ago [de-identified] : Patient is here today due to falling in January 2022, fell on her left side onto hips, hurt her left hip. Pain in buttocks down left leg pain started about 2 weeks later. Patient saw local orthopedist at that time sent for mri lumbar spine 1/19/22 given medrol dose pack no relief tried physical therapy no relief.

## 2022-03-23 NOTE — PHYSICAL EXAM
[Normal] : normal [Full] : is full [LE] : Sensory: Intact in bilateral lower extremities [Knee] : patellar 2+ and symmetric bilaterally [Ankle] : ankle 2+ and symmetric bilaterally [DP] : dorsalis pedis 2+ and symmetric bilaterally [PT] : posterior tibial 2+ and symmetric bilaterally [Poor Appearance] : well-appearing [Acute Distress] : not in acute distress [Obese] : not obese [Abl Mood] : in a normal mood [Abl Affect] : with normal affect [Poor Coordination] : normal coordination [Disorientation] : oriented x 3 [Painful] : not painful [SLR] : negative straight leg raise [FreeTextEntry2] : The pt is awake, alert and oriented to self, place and time, is comfortable and in no acute distress. Gait examination reveals a narrow based, non-ataxic, non-antalgic gait. Can heel and toe walk without difficulty. Inspection of neck, back and lower extremities bilaterally reveals no rashes or ecchymotic lesions.  There is no obvious abnormal spinal curvature in the sagittal and coronal planes with a slightly stooped forward posture noted. There is no tenderness over the cervical, thoracic or lumbar spine, or the upper and lower extremities musculature. No paraspinal lumbar tenderness. There is no sacroiliac tenderness. No greater trochanteric tenderness bilaterally. No atrophy or abnormal movements noted in the upper or lower extremities. There is no swelling noted in the upper or lower extremities bilaterally. No cervical lymphadenopathy noted anteriorly. No joint laxity noted in the upper and lower extremity joints bilaterally.\par  Hip range of motion is degrees internal rotation 30° external rotation without pain. Full range of motion of the shoulders bilaterally with no significant pain\par Negative straight leg raise to 45° in the sitting position bilaterally. There is no groin pain with hip internal rotation and a negative JULIAN test bilaterally.  [de-identified] : She can forward flex to her ankles and extend to 30° without pain. [de-identified] : seen with her  [de-identified] : 4 views lumbar spine obtained to demonstrate right-sided thoracolumbar curve. Minimal left-sided lumbar curve is seen as well. Significant degeneration seen at the L4-5 and L5-S1 levels with loss of disc height and endplate sclerosis. This is most prominent at the L5-S1 level. No acute fractures noted.\par \par AP pelvis demonstrates normal appearance of the hips bilaterally. No fractures or degeneration.\par \par Mild progressive degeneration L4-5, L5-S1 when compared to 11/8/17 xrays\par \par _______\par \par MRI lumbar spine without contrast performed at outside institution on January 19, 2022 which was a CT through the radiologist reveals degenerative changes at L5-S1 and L4-5.  Multilevel disc bulges.  Mild to moderate neuroforaminal stenosis.  No significant central stenosis.

## 2022-03-23 NOTE — REASON FOR VISIT
[Follow-Up Visit] : a follow-up visit for [Back Pain] : back pain [Radiculopathy] : radiculopathy [Spouse] : spouse

## 2022-03-23 NOTE — DISCUSSION/SUMMARY
[Medication Risks Reviewed] : Medication risks reviewed [de-identified] : Patient presents with symptoms of back pain after a fall.  On my interpretation of the MRI disc protrusion is noted at L5-S1 with some endplate degeneration changes.  Her symptoms are suggestive of lumbar radiculopathy with neural compression due to disc protrusion.  We discussed the option of injections for this which would be a left L4 and L5 transforaminal.  She will call to schedule.  For now trial of gabapentin and diclofenac was prescribed.  I will see her back in 4 to 6 weeks for reevaluation on as-needed basis.  If her symptoms persist lumbar epidural steroid injection left L4 and L5 and/or surgical decompression may be considered on the left side at L5-S1.

## 2022-05-30 ENCOUNTER — EMERGENCY (EMERGENCY)
Facility: HOSPITAL | Age: 82
LOS: 1 days | Discharge: HOME | End: 2022-05-30
Attending: EMERGENCY MEDICINE | Admitting: EMERGENCY MEDICINE
Payer: MEDICARE

## 2022-05-30 VITALS
HEIGHT: 62 IN | HEART RATE: 111 BPM | OXYGEN SATURATION: 98 % | SYSTOLIC BLOOD PRESSURE: 148 MMHG | DIASTOLIC BLOOD PRESSURE: 73 MMHG | TEMPERATURE: 99 F | RESPIRATION RATE: 18 BRPM

## 2022-05-30 DIAGNOSIS — Z90.710 ACQUIRED ABSENCE OF BOTH CERVIX AND UTERUS: Chronic | ICD-10-CM

## 2022-05-30 DIAGNOSIS — Z98.89 OTHER SPECIFIED POSTPROCEDURAL STATES: Chronic | ICD-10-CM

## 2022-05-30 LAB
ALBUMIN SERPL ELPH-MCNC: 4 G/DL — SIGNIFICANT CHANGE UP (ref 3.3–5)
ALP SERPL-CCNC: 55 U/L — SIGNIFICANT CHANGE UP (ref 40–120)
ALT FLD-CCNC: 13 U/L — SIGNIFICANT CHANGE UP (ref 4–33)
ANION GAP SERPL CALC-SCNC: 11 MMOL/L — SIGNIFICANT CHANGE UP (ref 7–14)
AST SERPL-CCNC: 21 U/L — SIGNIFICANT CHANGE UP (ref 4–32)
BASOPHILS # BLD AUTO: 0 K/UL — SIGNIFICANT CHANGE UP (ref 0–0.2)
BASOPHILS NFR BLD AUTO: 0 % — SIGNIFICANT CHANGE UP (ref 0–2)
BILIRUB SERPL-MCNC: 0.5 MG/DL — SIGNIFICANT CHANGE UP (ref 0.2–1.2)
BUN SERPL-MCNC: 10 MG/DL — SIGNIFICANT CHANGE UP (ref 7–23)
CALCIUM SERPL-MCNC: 9.1 MG/DL — SIGNIFICANT CHANGE UP (ref 8.4–10.5)
CHLORIDE SERPL-SCNC: 101 MMOL/L — SIGNIFICANT CHANGE UP (ref 98–107)
CO2 SERPL-SCNC: 24 MMOL/L — SIGNIFICANT CHANGE UP (ref 22–31)
CREAT SERPL-MCNC: 0.53 MG/DL — SIGNIFICANT CHANGE UP (ref 0.5–1.3)
EGFR: 93 ML/MIN/1.73M2 — SIGNIFICANT CHANGE UP
EOSINOPHIL # BLD AUTO: 0 K/UL — SIGNIFICANT CHANGE UP (ref 0–0.5)
EOSINOPHIL NFR BLD AUTO: 0 % — SIGNIFICANT CHANGE UP (ref 0–6)
GLUCOSE SERPL-MCNC: 106 MG/DL — HIGH (ref 70–99)
HCT VFR BLD CALC: 37.9 % — SIGNIFICANT CHANGE UP (ref 34.5–45)
HGB BLD-MCNC: 12.4 G/DL — SIGNIFICANT CHANGE UP (ref 11.5–15.5)
IANC: 5.26 K/UL — SIGNIFICANT CHANGE UP (ref 1.8–7.4)
LYMPHOCYTES # BLD AUTO: 0.16 K/UL — LOW (ref 1–3.3)
LYMPHOCYTES # BLD AUTO: 2.6 % — LOW (ref 13–44)
MAGNESIUM SERPL-MCNC: 2 MG/DL — SIGNIFICANT CHANGE UP (ref 1.6–2.6)
MCHC RBC-ENTMCNC: 29.9 PG — SIGNIFICANT CHANGE UP (ref 27–34)
MCHC RBC-ENTMCNC: 32.7 GM/DL — SIGNIFICANT CHANGE UP (ref 32–36)
MCV RBC AUTO: 91.3 FL — SIGNIFICANT CHANGE UP (ref 80–100)
MONOCYTES # BLD AUTO: 0.21 K/UL — SIGNIFICANT CHANGE UP (ref 0–0.9)
MONOCYTES NFR BLD AUTO: 3.5 % — SIGNIFICANT CHANGE UP (ref 2–14)
NEUTROPHILS # BLD AUTO: 5.61 K/UL — SIGNIFICANT CHANGE UP (ref 1.8–7.4)
NEUTROPHILS NFR BLD AUTO: 92.1 % — HIGH (ref 43–77)
NEUTS BAND # BLD: 0.9 % — SIGNIFICANT CHANGE UP (ref 0–6)
PLAT MORPH BLD: NORMAL — SIGNIFICANT CHANGE UP
PLATELET # BLD AUTO: 198 K/UL — SIGNIFICANT CHANGE UP (ref 150–400)
PLATELET COUNT - ESTIMATE: NORMAL — SIGNIFICANT CHANGE UP
POTASSIUM SERPL-MCNC: 3.5 MMOL/L — SIGNIFICANT CHANGE UP (ref 3.5–5.3)
POTASSIUM SERPL-SCNC: 3.5 MMOL/L — SIGNIFICANT CHANGE UP (ref 3.5–5.3)
PROT SERPL-MCNC: 6.6 G/DL — SIGNIFICANT CHANGE UP (ref 6–8.3)
RBC # BLD: 4.15 M/UL — SIGNIFICANT CHANGE UP (ref 3.8–5.2)
RBC # FLD: 12.6 % — SIGNIFICANT CHANGE UP (ref 10.3–14.5)
RBC BLD AUTO: NORMAL — SIGNIFICANT CHANGE UP
SARS-COV-2 RNA SPEC QL NAA+PROBE: DETECTED
SODIUM SERPL-SCNC: 136 MMOL/L — SIGNIFICANT CHANGE UP (ref 135–145)
VARIANT LYMPHS # BLD: 0.9 % — SIGNIFICANT CHANGE UP (ref 0–6)
WBC # BLD: 6.03 K/UL — SIGNIFICANT CHANGE UP (ref 3.8–10.5)
WBC # FLD AUTO: 6.03 K/UL — SIGNIFICANT CHANGE UP (ref 3.8–10.5)

## 2022-05-30 PROCEDURE — 99284 EMERGENCY DEPT VISIT MOD MDM: CPT | Mod: CS,GC

## 2022-05-30 RX ORDER — SODIUM CHLORIDE 9 MG/ML
1000 INJECTION INTRAMUSCULAR; INTRAVENOUS; SUBCUTANEOUS ONCE
Refills: 0 | Status: COMPLETED | OUTPATIENT
Start: 2022-05-30 | End: 2022-05-30

## 2022-05-30 RX ORDER — DIPHENHYDRAMINE HCL 50 MG
25 CAPSULE ORAL ONCE
Refills: 0 | Status: DISCONTINUED | OUTPATIENT
Start: 2022-05-30 | End: 2022-05-30

## 2022-05-30 RX ORDER — ONDANSETRON 8 MG/1
1 TABLET, FILM COATED ORAL
Qty: 15 | Refills: 0
Start: 2022-05-30 | End: 2022-06-03

## 2022-05-30 RX ORDER — METOCLOPRAMIDE HCL 10 MG
10 TABLET ORAL ONCE
Refills: 0 | Status: DISCONTINUED | OUTPATIENT
Start: 2022-05-30 | End: 2022-05-30

## 2022-05-30 RX ORDER — ONDANSETRON 8 MG/1
4 TABLET, FILM COATED ORAL ONCE
Refills: 0 | Status: COMPLETED | OUTPATIENT
Start: 2022-05-30 | End: 2022-05-30

## 2022-05-30 RX ADMIN — ONDANSETRON 4 MILLIGRAM(S): 8 TABLET, FILM COATED ORAL at 17:52

## 2022-05-30 RX ADMIN — ONDANSETRON 4 MILLIGRAM(S): 8 TABLET, FILM COATED ORAL at 17:30

## 2022-05-30 RX ADMIN — SODIUM CHLORIDE 1000 MILLILITER(S): 9 INJECTION INTRAMUSCULAR; INTRAVENOUS; SUBCUTANEOUS at 17:30

## 2022-05-30 RX ADMIN — SODIUM CHLORIDE 1000 MILLILITER(S): 9 INJECTION INTRAMUSCULAR; INTRAVENOUS; SUBCUTANEOUS at 18:30

## 2022-05-30 NOTE — ED PROVIDER NOTE - CLINICAL SUMMARY MEDICAL DECISION MAKING FREE TEXT BOX
82 yr female h/o Breast Ca. who tested Home COVID +ve now with nausea and generalized weakness for 1 day. VS and PE wnl. Plan for labs, CXR, medicate, observe. 82 yr female h/o Breast Ca. who tested Home COVID +ve now with nausea and generalized weakness for 1 day. VS and PE wnl. Plan for labs, CXR, medicate, observe.    ruy: pt here hx breast ca, dx, covid outside pos test, here witrh nausea and vomiting.  pt awake and alert and responsive, o2 sat is ok.  will hydrate, check labs and re-eval

## 2022-05-30 NOTE — ED PROVIDER NOTE - PATIENT PORTAL LINK FT
You can access the FollowMyHealth Patient Portal offered by [facility] by registering at the following website: [email or web address]. By joining [employer]’s FollowMyHealth portal, you will also be able to view your health information using other applications (apps) compatible with our system.

## 2022-05-30 NOTE — ED PROVIDER NOTE - NSICDXFAMILYHX_GEN_ALL_CORE_FT
FAMILY HISTORY:  Family history of pancreatic cancer    Aunt  Still living? No  Family history of breast cancer, Age at diagnosis: Age Unknown

## 2022-05-30 NOTE — ED PROVIDER NOTE - NSFOLLOWUPINSTRUCTIONS_ED_ALL_ED_FT
Nausea, Adult      Nausea is feeling sick to your stomach or feeling that you are about to throw up (vomit). Feeling sick to your stomach is usually not serious, but it may be an early sign of a more serious medical problem.    As you feel sicker to your stomach, you may throw up. If you throw up, or if you are not able to drink enough fluids, there is a risk that you may lose too much water in your body (get dehydrated). If you lose too much water in your body, you may:  •Feel tired.      •Feel thirsty.      •Have a dry mouth.      •Have cracked lips.      •Go pee (urinate) less often.      Older adults and people who have other diseases or a weak body defense system (immune system) have a higher risk of losing too much water in the body.    The main goals of treating this condition are:  •To relieve your nausea.      •To ensure your nausea occurs less often.      •To prevent throwing up and losing too much fluid.        Follow these instructions at home:    Watch your symptoms for any changes. Tell your doctor about them. Follow these instructions as told by your doctor.      Eating and drinking                   •Take an ORS (oral rehydration solution). This is a drink that is sold at pharmacies and stores.    •Drink clear fluids in small amounts as you are able. These include:  •Water.      •Ice chips.      •Fruit juice that has water added (diluted fruit juice).      •Low-calorie sports drinks.      •Eat bland, easy-to-digest foods in small amounts as you are able, such as:  •Bananas.      •Applesauce.      •Rice.      •Low-fat (lean) meats.      •Toast.      •Crackers.        •Avoid drinking fluids that have a lot of sugar or caffeine in them. This includes energy drinks, sports drinks, and soda.      •Avoid alcohol.      •Avoid spicy or fatty foods.      General instructions     •Take over-the-counter and prescription medicines only as told by your doctor.      •Rest at home while you get better.      •Drink enough fluid to keep your pee (urine) pale yellow.      •Take slow and deep breaths when you feel sick to your stomach.      •Avoid food or things that have strong smells.      •Wash your hands often with soap and water. If you cannot use soap and water, use hand .      •Make sure that all people in your home wash their hands well and often.      •Keep all follow-up visits as told by your doctor. This is important.        Contact a doctor if:    •You feel sicker to your stomach.      •You feel sick to your stomach for more than 2 days.      •You throw up.      •You are not able to drink fluids without throwing up.      •You have new symptoms.      •You have a fever.      •You have a headache.      •You have muscle cramps.      •You have a rash.      •You have pain while peeing.      •You feel light-headed or dizzy.        Get help right away if:    •You have pain in your chest, neck, arm, or jaw.      •You feel very weak or you pass out (faint).      •You have throw up that is bright red or looks like coffee grounds.      •You have bloody or black poop (stools) or poop that looks like tar.      •You have a very bad headache, a stiff neck, or both.      •You have very bad pain, cramping, or bloating in your belly (abdomen).      •You have trouble breathing or you are breathing very quickly.      •Your heart is beating very quickly.      •Your skin feels cold and clammy.      •You feel confused.    •You have signs of losing too much water in your body, such as:  •Dark pee, very little pee, or no pee.      •Cracked lips.      •Dry mouth.      •Sunken eyes.      •Sleepiness.      •Weakness.        These symptoms may be an emergency. Do not wait to see if the symptoms will go away. Get medical help right away. Call your local emergency services (911 in the [place] Do not drive yourself to the hospital.       Summary    •Nausea is feeling sick to your stomach or feeling that you are about to throw up (vomit).      •If you throw up, or if you are not able to drink enough fluids, there is a risk that you may lose too much water in your body (get dehydrated).      •Eat and drink what your doctor tells you. Take over-the-counter and prescription medicines only as told by your doctor.      •Contact a doctor right away if your symptoms get worse or you have new symptoms.      •Keep all follow-up visits as told by your doctor. This is important.      This information is not intended to replace advice given to you by your health care provider. Make sure you discuss any questions you have with your health care provider.

## 2022-05-30 NOTE — ED ADULT NURSE NOTE - OBJECTIVE STATEMENT
received pt in room 16, 82 yr/o male A+Ox4 ambulatory at baseline. PMH of left breast CA recently test COVID positive. presented to the ED C/O worsening generalized melase and N/V since yesterday. pt had 1 episode of vomiting in the ED. VSS, denies chest pain and SOB, RR even and unlabored, dry cough noted. right Ac 20g placed labs drawn and sent. medications given as ordered. will continue to monitor.

## 2022-05-30 NOTE — ED ADULT TRIAGE NOTE - CHIEF COMPLAINT QUOTE
pt brought to triage by WC c/o nausea associated w/ decreased PO intake and weakness x 2 days, positive home rapid covid test yesterday, no resp distress noted, appears in NAD @ this time

## 2022-05-30 NOTE — ED PROVIDER NOTE - OBJECTIVE STATEMENT
82 yr female h/o Breast Ca. who tested Home COVID +ve now with nausea and generalized weakness for 1 day.   Complains of dry cough, no fever, SOB, abdominal pain, urinary/bowel complaints.

## 2022-05-30 NOTE — ED PROVIDER NOTE - ATTENDING CONTRIBUTION TO CARE
ruy: pt here hx breast ca, dx, covid outside pos test, here witrh nausea and vomiting.  pt awake and alert and responsive, o2 sat is ok.  will hydrate, check labs and re-eval    I performed a history and physical exam of the patient and discussed their management with the resident and /or advanced care provider. I reviewed the resident and /or ACP's note and agree with the documented findings and plan of care. My medical decison making and observations are found above.

## 2022-05-30 NOTE — ED ADULT TRIAGE NOTE - ESI TRIAGE ACUITY LEVEL, MLM
Safety counseling, including water safety, sun safety, protective equipment, car safety, and tick safety  May use Tums or other antacid to see if that will help the abdominal pain  Work up the abdominal pain with ultrasound and additional blood testing  Suspect vitamin-D deficiency due to the low milk intake  Will get lab testing to document  Cleared for all activities  Immunizations are complete for age  Follow-up:  By telephone for the lab and x-ray results, this vaibhav for influenza immunization, at yearly physical examinations, and as otherwise needed  Well Child Visit at 7 to 8 Years   AMBULATORY CARE:   A well child visit  is when your child sees a healthcare provider to prevent health problems  Well child visits are used to track your child's growth and development  It is also a time for you to ask questions and to get information on how to keep your child safe  Write down your questions so you remember to ask them  Your child should have regular well child visits from birth to 16 years  Development milestones your child may reach at 7 to 8 years:  Each child develops at his or her own pace  Your child might have already reached the following milestones, or he or she may reach them later:  · Lose baby teeth and grow in adult teeth    · Develop friendships and a best friend    · Help with tasks such as setting the table    · Tell time on a face clock     · Know days and months    · Ride a bicycle or play sports    · Start reading on his or her own and solving math problems  Help your child get the right nutrition:   · Teach your child about a healthy meal plan by setting a good example  Buy healthy foods for your family  Eat healthy meals together as a family as often as possible  Talk with your child about why it is important to choose healthy foods  · Provide a variety of fruits and vegetables  Half of your child's plate should contain fruits and vegetables   He or she should eat about 5 servings of fruits and vegetables each day  Buy fresh, canned, or dried fruit instead of fruit juice as often as possible  Offer more dark green, red, and orange vegetables  Dark green vegetables include broccoli, spinach, giacomo lettuce, and taylor greens  Examples of orange and red vegetables are carrots, sweet potatoes, winter squash, and red peppers  · Make sure your child has a healthy breakfast every day  Breakfast can help your child learn and focus better in school  · Limit foods that contain sugar and are low in healthy nutrients  Limit candy, soda, fast food, and salty snacks  Do not give your child fruit drinks  Limit 100% juice to 4 to 6 ounces each day  · Teach your child how to make healthy food choices  A healthy lunch may include a sandwich with lean meat, cheese, or peanut butter  It could also include a fruit, vegetable, and milk  Pack healthy foods if your child takes his or her own lunch to school  Pack baby carrots or pretzels instead of potato chips in your child's lunch box  You can also add fruit or low-fat yogurt instead of cookies  Keep your child's lunch cold with an ice pack so that it does not spoil  · Make sure your child gets enough calcium  Calcium is needed to build strong bones and teeth  Children need about 2 to 3 servings of dairy each day to get enough calcium  Good sources of calcium are low-fat dairy foods (milk, cheese, and yogurt)  A serving of dairy is 8 ounces of milk or yogurt, or 1½ ounces of cheese  Other foods that contain calcium include tofu, kale, spinach, broccoli, almonds, and calcium-fortified orange juice  Ask your child's healthcare provider for more information about the serving sizes of these foods  · Provide whole-grain foods  Half of the grains your child eats each day should be whole grains  Whole grains include brown rice, whole-wheat pasta, and whole-grain cereals and breads       · Provide lean meats, poultry, fish, and other healthy protein foods  Other healthy protein foods include legumes (such as beans), soy foods (such as tofu), and peanut butter  Bake, broil, and grill meat instead of frying it to reduce the amount of fat  · Use healthy fats to prepare your child's food  A healthy fat is unsaturated fat  It is found in foods such as soybean, canola, olive, and sunflower oils  It is also found in soft tub margarine that is made with liquid vegetable oil  Limit unhealthy fats such as saturated fat, trans fat, and cholesterol  These are found in shortening, butter, stick margarine, and animal fat  Help your  for his or her teeth:   · Remind your child to brush his or her teeth 2 times each day  Also, have your child floss once every day  Mouth care prevents infection, plaque, bleeding gums, mouth sores, and cavities  It also freshens breath and improves appetite  Brush, floss, and use mouthwash  Ask your child's dentist which mouthwash is best for you to use  · Take your child to the dentist at least 2 times each year  A dentist can check for problems with his or her teeth or gums, and provide treatments to protect his or her teeth  · Encourage your child to wear a mouth guard during sports  This will protect his or her teeth from injury  Make sure the mouth guard fits correctly  Ask your child's healthcare provider for more information on mouth guards  Keep your child safe:   · Have your child ride in a booster seat  and make sure everyone in your car wears a seatbelt  ¨ Children aged 9 to 8 years should ride in a booster car seat in the back seat  ¨ Booster seats come with and without a seat back  Your child will be secured in the booster seat with the regular seatbelt in your car  ¨ Your child must stay in the booster car seat until he or she is between 6and 15years old and 4 foot 9 inches (57 inches) tall  This is when a regular seatbelt should fit your child properly without the booster seat       ¨ Your child should remain in a forward-facing car seat if you only have a lap belt seatbelt in your car  Some forward-facing car seats hold children who weigh more than 40 pounds  The harness on the forward-facing car seat will keep your child safer and more secure than a lap belt and booster seat  · Encourage your child to use safety equipment  Encourage him or her to wear helmets, protective sports gear, and life jackets  · Teach your child how to swim  Even if your child knows how to swim, do not let him or her play around water alone  An adult needs to be present and watching at all times  Make sure your child wears a safety vest when on a boat  · Put sunscreen on your child before he or she goes outside to play or swim  Use sunscreen with a SPF 15 or higher  Use as directed  Apply sunscreen at least 15 minutes before going outside  Reapply sunscreen every 2 hours when outside  · Remind your child how to cross the street safely  Remind your child to stop at the curb, look left, then look right, and left again  Tell your child to never cross the street without a grownup  Teach your child where the school bus will  and let off  Always have adult supervision at your child's bus stop  · Store and lock all guns and weapons  Make sure all guns are unloaded before you store them  Make sure your child cannot reach or find where weapons are kept  Never  leave a loaded gun unattended  · Remind your child about emergency safety  Be sure your child knows what to do in case of a fire or other emergency  Teach your child how to call 911  · Talk to your child about personal safety without making him or her anxious  Teach him or her that no one has the right to touch his or her private parts  Also explain that no one should ask your child to touch their private parts  Let your child know that he or she should tell you even if he or she is told not to    Support your child:   · Encourage your child to get 1 hour of physical activity each day  Examples of physical activities include sports, running, walking, swimming, and riding bikes  The hour of physical activity does not need to be done all at once  It can be done in shorter blocks of time  · Limit screen time  Your child should spend less than 2 hours watching TV, using the computer, or playing video games  Set up a security filter on your computer to limit what your child can access on the internet  · Encourage your child to talk about school every day  Talk to your child about the good and bad things that may have happened during the school day  Encourage your child to tell you or a teacher if someone is being mean to him or her  Talk to your child's teacher about help or tutoring if your child is not doing well in school  · Help your child feel confident and secure  Give your child hugs and encouragement  Do activities together  Help him or her do tasks independently  Praise your child when they do tasks and activities well  Do not hit, shake, or spank your child  Set boundaries and reasonable consequences when rules are broken  Teach your child about acceptable behaviors  What you need to know about your child's next well child visit:  Your child's healthcare provider will tell you when to bring him or her in again  The next well child visit is usually at 9 to 10 years  Contact your child's healthcare provider if you have questions or concerns about your child's health or care before the next visit  Your child may need catch-up doses of the hepatitis B, hepatitis A, MMR, or chickenpox vaccine  Remember to take your child in for a yearly flu vaccine  © 2017 [facility] Information is for End User's use only and may not be sold, redistributed or otherwise used for commercial purposes  All illustrations and images included in CareNotes® are the copyrighted property of [employer]  or [employer]az[employer]    The above information is an  only  It is not intended as medical advice for individual conditions or treatments  Talk to your doctor, nurse or pharmacist before following any medical regimen to see if it is safe and effective for you  3

## 2022-05-30 NOTE — ED ADULT NURSE NOTE - PAIN: PRESENCE, MLM
Pt discharge teaching taught via teach back method with mother. Talked about medication and pt care. No other concerns voiced. Pt carried out in car seat by mother to leave, rr easy and unlabored.      Quinton Resendez RN  08/02/19 0225
complains of pain/discomfort

## 2022-05-30 NOTE — ED PROVIDER NOTE - NS ED ROS FT
CONSTITUTIONAL: c/o weakness, No fevers or chills  EYES/ENT: No visual changes;  No vertigo or throat pain   NECK: No pain or stiffness  RESPIRATORY: No cough, wheezing, hemoptysis; No shortness of breath  CARDIOVASCULAR: No chest pain or palpitations  GASTROINTESTINAL: No abdominal or epigastric pain. No nausea, vomiting, diarrhea or constipation.   GENITOURINARY: No dysuria, frequency   NEUROLOGICAL: No numbness or weakness  SKIN: No itching, burning, rashes, or lesions     All other review of systems is negative unless indicated above.

## 2022-05-30 NOTE — ED PROVIDER NOTE - NSICDXPASTSURGICALHX_GEN_ALL_CORE_FT
PAST SURGICAL HISTORY:  S/P bunionectomy arch lift 2014    S/P hysterectomy 1990    S/P lumpectomy, left breast left 2005

## 2022-05-30 NOTE — ED PROVIDER NOTE - NSICDXPASTMEDICALHX_GEN_ALL_CORE_FT
PAST MEDICAL HISTORY:  Arch pain of right foot     Breast cancer 2005    Bunion of great toe of right foot

## 2022-05-30 NOTE — ED PROVIDER NOTE - PROGRESS NOTE DETAILS
Spoke with pt daughter, she states that the pt is doing better. She feels like the pt did better when she was having PT and OT coming to her house, however that has been discontinued. Pt daughter doesn't think she need a follow up apt at this time.    Scott BHAKTA: Patient improved, will complete rehydration and D/C.

## 2022-05-30 NOTE — ED ADULT NURSE NOTE - NSIMPLEMENTINTERV_GEN_ALL_ED
Implemented All Fall with Harm Risk Interventions:  [place] to call system. Call bell, personal items and telephone within reach. Instruct patient to call for assistance. Room bathroom lighting operational. Non-slip footwear when patient is off stretcher. Physically safe environment: no spills, clutter or unnecessary equipment. Stretcher in lowest position, wheels locked, appropriate side rails in place. Provide visual cue, wrist band, yellow gown, etc. Monitor gait and stability. Monitor for mental status changes and reorient to person, place, and time. Review medications for side effects contributing to fall risk. Reinforce activity limits and safety measures with patient and family. Provide visual clues: red socks.

## 2022-05-30 NOTE — ED PROVIDER NOTE - PHYSICAL EXAMINATION
PHYSICAL EXAM:    GENERAL: NAD, lying in bed comfortably  HEAD:  Atraumatic, Normocephalic  EYES: EOMI, PERRLA, conjunctiva and sclera clear  ENT: No erythema/pallor/petechiae/lesions; TMs clear b/l  NECK: Supple, No JVD  LUNG: CTA b/l; no r/r/w  HEART: RRR, +S1/S2; No m/r/g  ABDOMEN: soft, NT/ND; BS audible   EXTREMITIES:  2+ Peripheral Pulses, brisk cap refill. No clubbing, cyanosis, or edema  NERVOUS SYSTEM:  AAOx3, speech clear. No sensory/motor deficits   SKIN: No rashes or lesions

## 2022-06-11 NOTE — ED ADULT NURSE NOTE - PSH
Patient alert and orientated x 4. Room air. Report abdominal pain, prn tylenol given with minimum pain relief. IV abx maintained. PICC WDL. Blood sugars monitored, no SSI per order. Ex cath in place. Call light within reach. Safety monitored and maintained.    Problem: Pain  Goal: #Acceptable pain level achieved/maintained at rest using NRS/Faces  Description: This goal is used for patients who can self-report.  Acceptable means the level is at or below the identified comfort/function goal.  Outcome: Outcome Met, Continue evaluating goal progress toward completion  Goal: # Acceptable pain level achieved/maintained at rest using NRS/Faces without oversedation (opioid naive or PCA/Epidural infusion)  Description: This goal is used if Opioid-naïve or on PCA/Epidural Infusion.  Outcome: Outcome Met, Continue evaluating goal progress toward completion  Goal: # Acceptable pain level achieved/maintained with activity using NRS/Faces  Description: This goal is used for patients who can self-report and are not achieving acceptable pain control during activity.  Outcome: Outcome Met, Continue evaluating goal progress toward completion  Goal: Acceptable pain/comfort level is achieved/maintained at rest (based on Pain Behaviors Scale)  Description: This goal is used for patients who are not able to self-report pain and are assessed for pain using the Pain Behaviors Scale  Outcome: Outcome Met, Continue evaluating goal progress toward completion  Goal: Acceptable pain/comfort level is achieved/maintained at rest based on PAINAID scale (Dementia)  Description: This goal is used for patients who are not able to self-report pain, have dementia, and assessed using the PAINAD scale.  Outcome: Outcome Met, Continue evaluating goal progress toward completion  Goal: Acceptable pain/comfort level is achieved/maintained at rest (based on pediatric behavior tool: NIPS, NPASS, or FLACC)  Description: This goal is used for pediatric patients  who are not able to self report pain.  Outcome: Outcome Met, Continue evaluating goal progress toward completion  Goal: # Verbalizes understanding of pain management  Description: Documented in Patient Education Activity  Outcome: Outcome Met, Continue evaluating goal progress toward completion  Goal: Verbalizes understanding and effective use of Patient Controlled Analgesia (PCA)  Description: Documented in Patient Education Activity  This goal is used for patients with PCA  Outcome: Outcome Met, Continue evaluating goal progress toward completion  Goal: Maximum comfort achieved/maintained at end of life (Hospice)  Outcome: Outcome Met, Continue evaluating goal progress toward completion      S/P bunionectomy  arch lift 2014  S/P hysterectomy  1990  S/P lumpectomy, left breast  left 2005

## 2022-06-22 ENCOUNTER — APPOINTMENT (OUTPATIENT)
Dept: ORTHOPEDIC SURGERY | Facility: CLINIC | Age: 82
End: 2022-06-22
Payer: MEDICARE

## 2022-06-22 VITALS — HEART RATE: 76 BPM | DIASTOLIC BLOOD PRESSURE: 80 MMHG | SYSTOLIC BLOOD PRESSURE: 140 MMHG

## 2022-06-22 DIAGNOSIS — M54.16 RADICULOPATHY, LUMBAR REGION: ICD-10-CM

## 2022-06-22 DIAGNOSIS — M51.26 OTHER INTERVERTEBRAL DISC DISPLACEMENT, LUMBAR REGION: ICD-10-CM

## 2022-06-22 PROCEDURE — 99213 OFFICE O/P EST LOW 20 MIN: CPT

## 2022-06-22 RX ORDER — ONDANSETRON 4 MG/1
4 TABLET ORAL
Qty: 15 | Refills: 0 | Status: ACTIVE | COMMUNITY
Start: 2022-05-30

## 2022-06-22 RX ORDER — FAMOTIDINE 20 MG/1
20 TABLET, FILM COATED ORAL
Qty: 90 | Refills: 0 | Status: ACTIVE | COMMUNITY
Start: 2022-06-20

## 2022-06-22 RX ORDER — BENZONATATE 100 MG/1
100 CAPSULE ORAL
Qty: 10 | Refills: 0 | Status: ACTIVE | COMMUNITY
Start: 2022-05-30

## 2022-06-22 RX ORDER — DICLOFENAC SODIUM 50 MG/1
50 TABLET, DELAYED RELEASE ORAL
Qty: 30 | Refills: 2 | Status: ACTIVE | COMMUNITY
Start: 2022-03-23 | End: 1900-01-01

## 2022-06-22 NOTE — REASON FOR VISIT
[Follow-Up Visit] : a follow-up visit for [Degenerative Joint Disease] : degenerative joint disease [Herniated Discs] : herniated discs [Back Pain] : back pain [Radiculopathy] : radiculopathy [Spouse] : spouse [FreeTextEntry2] : Left lumbar radiculitis

## 2022-06-22 NOTE — DISCUSSION/SUMMARY
[Medication Risks Reviewed] : Medication risks reviewed [de-identified] : may try gabapentin 100 mg increase upto 300 mg qhs.Can try diclofenac as previously prescribed as well which she did not take.  Refill of gabapentin and diclofenac was provided today.\par Tried PT without relief and has not interested in more physical therapy.  Recommended she do her own exercises at home\par .May consider left L4, L5 MARTIN if she is not improved by next week, she will call the office to schedule.  She is not interested in more definitive treatment for this which would involve lumbar laminectomy and decompression.

## 2022-06-22 NOTE — PHYSICAL EXAM
[Normal] : normal [Full] : is full [LE] : Sensory: Intact in bilateral lower extremities [Knee] : patellar 2+ and symmetric bilaterally [Ankle] : ankle 2+ and symmetric bilaterally [DP] : dorsalis pedis 2+ and symmetric bilaterally [PT] : posterior tibial 2+ and symmetric bilaterally [Poor Appearance] : well-appearing [Acute Distress] : not in acute distress [Obese] : not obese [Abl Mood] : in a normal mood [Abl Affect] : with normal affect [Poor Coordination] : normal coordination [Disorientation] : oriented x 3 [Painful] : not painful [SLR] : negative straight leg raise [FreeTextEntry2] : The pt is awake, alert and oriented to self, place and time, is comfortable and in no acute distress. Gait examination reveals a narrow based, non-ataxic, non-antalgic gait. Can heel and toe walk without difficulty. Inspection of neck, back and lower extremities bilaterally reveals no rashes or ecchymotic lesions.  There is no obvious abnormal spinal curvature in the sagittal and coronal planes with a slightly stooped forward posture noted. There is no tenderness over the cervical, thoracic or lumbar spine, or the upper and lower extremities musculature. No paraspinal lumbar tenderness. There is no sacroiliac tenderness. No greater trochanteric tenderness bilaterally. No atrophy or abnormal movements noted in the upper or lower extremities. There is no swelling noted in the upper or lower extremities bilaterally. No cervical lymphadenopathy noted anteriorly. No joint laxity noted in the upper and lower extremity joints bilaterally.\par  Hip range of motion is degrees internal rotation 30° external rotation without pain. Full range of motion of the shoulders bilaterally with no significant pain\par Negative straight leg raise to 45° in the sitting position bilaterally. There is no groin pain with hip internal rotation and a negative JULIAN test bilaterally.  [de-identified] : She can forward flex to her ankles and extend to 30° without pain. [de-identified] : seen with her

## 2022-06-22 NOTE — HISTORY OF PRESENT ILLNESS
[Worsening] : worsening [7] : a current pain level of 7/10 [Prolonged Sitting] : worsened by prolonged sitting [Prolonged Standing] : worsened by prolonged standing [None] : No relieving factors are noted [de-identified] : Patient presents here today for revaluation of low back pain. Patient reports pain level today is 7/10 in intensity and worsens repositioning from sitting to standing. Patient reports muscle spasms ad numbness o bilateral lower extremities to feet.\par  [de-identified] : sitting to stand positioning

## 2022-07-29 ENCOUNTER — APPOINTMENT (OUTPATIENT)
Dept: ORTHOPEDIC SURGERY | Facility: HOSPITAL | Age: 82
End: 2022-07-29

## 2022-07-29 ENCOUNTER — OUTPATIENT (OUTPATIENT)
Dept: OUTPATIENT SERVICES | Facility: HOSPITAL | Age: 82
LOS: 1 days | End: 2022-07-29
Payer: MEDICARE

## 2022-07-29 DIAGNOSIS — Z98.89 OTHER SPECIFIED POSTPROCEDURAL STATES: Chronic | ICD-10-CM

## 2022-07-29 DIAGNOSIS — Z90.710 ACQUIRED ABSENCE OF BOTH CERVIX AND UTERUS: Chronic | ICD-10-CM

## 2022-07-29 DIAGNOSIS — M51.37 OTHER INTERVERTEBRAL DISC DEGENERATION, LUMBOSACRAL REGION: ICD-10-CM

## 2022-07-29 DIAGNOSIS — M54.16 RADICULOPATHY, LUMBAR REGION: ICD-10-CM

## 2022-07-29 PROCEDURE — 64483 NJX AA&/STRD TFRM EPI L/S 1: CPT | Mod: 50

## 2022-08-01 ENCOUNTER — APPOINTMENT (OUTPATIENT)
Dept: ORTHOPEDIC SURGERY | Facility: CLINIC | Age: 82
End: 2022-08-01

## 2022-08-01 VITALS
SYSTOLIC BLOOD PRESSURE: 149 MMHG | HEIGHT: 62 IN | WEIGHT: 140 LBS | DIASTOLIC BLOOD PRESSURE: 78 MMHG | BODY MASS INDEX: 25.76 KG/M2 | HEART RATE: 87 BPM

## 2022-08-01 DIAGNOSIS — M54.6 PAIN IN THORACIC SPINE: ICD-10-CM

## 2022-08-01 DIAGNOSIS — M51.37 OTHER INTERVERTEBRAL DISC DEGENERATION, LUMBOSACRAL REGION: ICD-10-CM

## 2022-08-01 DIAGNOSIS — M54.16 RADICULOPATHY, LUMBAR REGION: ICD-10-CM

## 2022-08-01 DIAGNOSIS — M51.34 OTHER INTERVERTEBRAL DISC DEGENERATION, THORACIC REGION: ICD-10-CM

## 2022-08-01 PROCEDURE — 96372 THER/PROPH/DIAG INJ SC/IM: CPT

## 2022-08-01 PROCEDURE — 99214 OFFICE O/P EST MOD 30 MIN: CPT | Mod: 25

## 2022-08-01 RX ORDER — KETOROLAC TROMETHAMINE 30 MG/ML
30 INJECTION, SOLUTION INTRAMUSCULAR; INTRAVENOUS
Qty: 1 | Refills: 0 | Status: COMPLETED | OUTPATIENT
Start: 2022-08-01

## 2022-08-01 RX ORDER — DICLOFENAC SODIUM 3 G/100G
3 GEL TOPICAL TWICE DAILY
Qty: 1 | Refills: 0 | Status: ACTIVE | COMMUNITY
Start: 2022-08-01 | End: 1900-01-01

## 2022-08-01 RX ORDER — GABAPENTIN 100 MG/1
100 CAPSULE ORAL
Qty: 60 | Refills: 2 | Status: ACTIVE | COMMUNITY
Start: 2022-03-24 | End: 1900-01-01

## 2022-08-01 RX ADMIN — KETOROLAC TROMETHAMINE 0 MG/ML: 30 INJECTION, SOLUTION INTRAMUSCULAR; INTRAVENOUS at 00:00

## 2022-08-02 NOTE — HISTORY OF PRESENT ILLNESS
[8] : a current pain level of 8/10 [Daily] : ~He/She~ states the symptoms seem to be occuring daily [None] : No relieving factors are noted [de-identified] : Patient is here today due to severe burning in her thoracic area. Patient states on Friday 7/29/22  had lumbar epidural injection then Saturday 7/30/22 started getting mid back burning and pain and by Sunday unable to sleep.\par Lidocaine 5% without relief\par Dicofenac is helpfil a little, naprosyn 500 mg not helpful [de-identified] : sleeping

## 2022-08-02 NOTE — REASON FOR VISIT
[Follow-Up Visit] : a follow-up visit for [Degenerative Joint Disease] : degenerative joint disease [Herniated Discs] : herniated discs [Back Pain] : back pain [Radiculopathy] : radiculopathy [Spouse] : spouse [FreeTextEntry2] : Thoracic ddd

## 2022-08-02 NOTE — DISCUSSION/SUMMARY
[Medication Risks Reviewed] : Medication risks reviewed [de-identified] : Patient has a known history of thoracic disc degeneration and has a history of prior right-sided thoracic pain.  Her current presentation is likely exacerbation of her pre-existing condition likely due to positioning in the prone position for her lumbar epidural steroid injection.  She has no complaints related to a lumbar epidural steroid injection with the complete resolution of left leg pain and improving right leg symptoms.  However she is now bothered primarily by her thoracic symptoms.  Prescribed her diclofenac gel to apply over the area of pain and prescribed her gabapentin.  For pain also offered her an injection of Toradol and she wanted to proceed.  Under sterile conditions 30 mg of Toradol was administered intermuscularly by the LPN without incident.

## 2022-08-02 NOTE — PHYSICAL EXAM
[Normal] : normal [Full] : is full [LE] : Sensory: Intact in bilateral lower extremities [Knee] : patellar 2+ and symmetric bilaterally [Ankle] : ankle 2+ and symmetric bilaterally [DP] : dorsalis pedis 2+ and symmetric bilaterally [PT] : posterior tibial 2+ and symmetric bilaterally [Poor Appearance] : well-appearing [Acute Distress] : not in acute distress [Obese] : not obese [Abl Mood] : in a normal mood [Abl Affect] : with normal affect [Poor Coordination] : normal coordination [Disorientation] : oriented x 3 [Painful] : not painful [SLR] : negative straight leg raise [FreeTextEntry2] : The pt is awake, alert and oriented to self, place and time, is comfortable and in no acute distress. Gait examination reveals a narrow based, non-ataxic, non-antalgic gait. Can heel and toe walk without difficulty. Inspection of neck, back and lower extremities bilaterally reveals no rashes or ecchymotic lesions.  There is no obvious abnormal spinal curvature in the sagittal and coronal planes with a slightly stooped forward posture noted. There is no tenderness over the cervical, thoracic or lumbar spine, or the upper and lower extremities musculature. No paraspinal lumbar tenderness. There is no sacroiliac tenderness. No greater trochanteric tenderness bilaterally. No atrophy or abnormal movements noted in the upper or lower extremities. There is no swelling noted in the upper or lower extremities bilaterally. No cervical lymphadenopathy noted anteriorly. No joint laxity noted in the upper and lower extremity joints bilaterally.\par  Hip range of motion is degrees internal rotation 30° external rotation without pain. Full range of motion of the shoulders bilaterally with no significant pain\par Negative straight leg raise to 45° in the sitting position bilaterally. There is no groin pain with hip internal rotation and a negative JULIAN test bilaterally.  [de-identified] : She can forward flex to her ankles and extend to 30° without pain. [de-identified] : seen with her \par right sided thoracic paraspinal tenderness

## 2022-09-06 NOTE — PATIENT PROFILE ADULT. - TOBACCO USE
Detail Level: Zone Photo Preface (Leave Blank If You Do Not Want): Photographs were obtained today Never smoker

## 2022-12-12 ENCOUNTER — APPOINTMENT (OUTPATIENT)
Dept: ORTHOPEDIC SURGERY | Facility: CLINIC | Age: 82
End: 2022-12-12

## 2022-12-12 VITALS
WEIGHT: 136 LBS | HEIGHT: 62 IN | HEART RATE: 97 BPM | DIASTOLIC BLOOD PRESSURE: 81 MMHG | BODY MASS INDEX: 25.03 KG/M2 | SYSTOLIC BLOOD PRESSURE: 151 MMHG

## 2022-12-12 DIAGNOSIS — M54.6 PAIN IN THORACIC SPINE: ICD-10-CM

## 2022-12-12 PROCEDURE — 72100 X-RAY EXAM L-S SPINE 2/3 VWS: CPT

## 2022-12-12 PROCEDURE — 99214 OFFICE O/P EST MOD 30 MIN: CPT | Mod: 25

## 2022-12-12 PROCEDURE — 72070 X-RAY EXAM THORAC SPINE 2VWS: CPT

## 2022-12-12 PROCEDURE — 96372 THER/PROPH/DIAG INJ SC/IM: CPT

## 2022-12-12 RX ORDER — IBUPROFEN 600 MG/1
600 TABLET, FILM COATED ORAL 3 TIMES DAILY
Qty: 30 | Refills: 0 | Status: ACTIVE | COMMUNITY
Start: 2022-12-12 | End: 1900-01-01

## 2022-12-12 RX ORDER — LIDOCAINE 5% 700 MG/1
5 PATCH TOPICAL
Qty: 1 | Refills: 2 | Status: ACTIVE | COMMUNITY
Start: 2022-12-12 | End: 1900-01-01

## 2022-12-12 RX ORDER — GABAPENTIN 100 MG/1
100 CAPSULE ORAL
Qty: 30 | Refills: 2 | Status: ACTIVE | COMMUNITY
Start: 2022-12-12 | End: 1900-01-01

## 2022-12-12 RX ADMIN — KETOROLAC TROMETHAMINE 0 MG/ML: 30 INJECTION, SOLUTION INTRAMUSCULAR; INTRAVENOUS at 00:00

## 2022-12-12 NOTE — REASON FOR VISIT
[Follow-Up Visit] : a follow-up visit for [Degenerative Joint Disease] : degenerative joint disease [Back Pain] : back pain [Radiculopathy] : radiculopathy [Spouse] : spouse

## 2022-12-12 NOTE — HISTORY OF PRESENT ILLNESS
[Worsening] : worsening [10] : a current pain level of 10/10 [Daily] : ~He/She~ states the symptoms seem to be occuring daily [None] : No relieving factors are noted [de-identified] : Patient is here today for re evaluation on her chronic low back and thoracic ddd pain. Patient states lumbar epidural injection in 7/2022 lasted one week then the pain came back Patient states voltaren naprosyn and advil not really helping. stopped the gabapentin no relief. Going for physical therapy manipulated her since then increase in pain and spasms.\par Pain along left side\par PT helped her in the beginning but then worsened left sided thoracic pain from 12/10/22. Increased with home exercise yesterday.  [de-identified] : any type of movement

## 2022-12-12 NOTE — PHYSICAL EXAM
[Normal] : normal [Full] : is full [LE] : Sensory: Intact in bilateral lower extremities [Knee] : patellar 2+ and symmetric bilaterally [Ankle] : ankle 2+ and symmetric bilaterally [DP] : dorsalis pedis 2+ and symmetric bilaterally [PT] : posterior tibial 2+ and symmetric bilaterally [de-identified] : AP and lateral image of thoracic spine demonstrates right-sided thoracolumbar curve.  Diffuse osteopenia.  Bony details are obscured by overlying soft tissue density.  No obvious acute fractures noted.  Normal thoracic kyphosis.  Multilevel mild mid to lower thoracic degenerative changes.\par \par AP and lateral image of the lumbar spine demonstrates right-sided thoracolumbar curve.  Normal lumbar lordosis.  Diffuse osteopenia.  No obvious acute fracture.  Fine bony detail obscured by overlying soft tissue densities. [Poor Appearance] : well-appearing [Acute Distress] : not in acute distress [Obese] : not obese [Abl Mood] : in a normal mood [Abl Affect] : with normal affect [Poor Coordination] : normal coordination [Disorientation] : oriented x 3 [Painful] : not painful [SLR] : negative straight leg raise [FreeTextEntry2] : The pt is awake, alert and oriented to self, place and time, is comfortable and in no acute distress. Gait examination reveals a narrow based, non-ataxic, non-antalgic gait. Can heel and toe walk without difficulty. Inspection of neck, back and lower extremities bilaterally reveals no rashes or ecchymotic lesions.  There is no obvious abnormal spinal curvature in the sagittal and coronal planes with a slightly stooped forward posture noted. There is no tenderness over the cervical, thoracic or lumbar spine, or the upper and lower extremities musculature. No paraspinal lumbar tenderness. There is no sacroiliac tenderness. No greater trochanteric tenderness bilaterally. No atrophy or abnormal movements noted in the upper or lower extremities. There is no swelling noted in the upper or lower extremities bilaterally. No cervical lymphadenopathy noted anteriorly. No joint laxity noted in the upper and lower extremity joints bilaterally.\par  Hip range of motion is degrees internal rotation 30° external rotation without pain. Full range of motion of the shoulders bilaterally with no significant pain\par Negative straight leg raise to 45° in the sitting position bilaterally. There is no groin pain with hip internal rotation and a negative JULIAN test bilaterally.  [de-identified] : She can forward flex to her ankles and extend to 30° without pain. [de-identified] : seen with her \par right sided thoracic paraspinal tenderness

## 2022-12-12 NOTE — DISCUSSION/SUMMARY
[Medication Risks Reviewed] : Medication risks reviewed [de-identified] : Patient feels increased pain left-sided thoracic spine from December 10, 2022.  She had a few sessions of physical therapy with increased symptoms.  At this time recommended she stop the physical therapy and focus on self-directed stretching exercise program.  For her pain offered her an injection of Toradol and she wanted to proceed.  Under sterile conditions 30 mg of Toradol was\par Administered intermuscularly by the LPN without incident.  Prescribed her ibuprofen which has been helpful for her and a trial of gabapentin.  Lidocaine patch was also prescribed for her left-sided thoracic pain.\par \par The pain persists or worsens recommended she contact the office in a week at which time we can consider obtaining an MRI of the thoracic and/or lumbar spine for further evaluation.\par \par The patient was educated regarding their condition, treatment options as well as prescribed course of treatment. \par Risks and benefits as well as alternatives to the proposed treatment were also provided to the patient \par They were given the opportunity to have all their questions answered to their satisfaction.\par \par Vital signs were reviewed with the patient and the patient was instructed to followup with their primary care provider for further management. There were no PAs or scribes used in the evaluation, exam or treatment plan discussion. The surgeon was the primary evaluating or treating physician as noted above.

## 2023-07-26 ENCOUNTER — NON-APPOINTMENT (OUTPATIENT)
Age: 83
End: 2023-07-26

## 2023-07-26 ENCOUNTER — APPOINTMENT (OUTPATIENT)
Dept: ORTHOPEDIC SURGERY | Facility: CLINIC | Age: 83
End: 2023-07-26
Payer: MEDICARE

## 2023-07-26 VITALS
HEART RATE: 87 BPM | WEIGHT: 135 LBS | BODY MASS INDEX: 24.84 KG/M2 | DIASTOLIC BLOOD PRESSURE: 84 MMHG | SYSTOLIC BLOOD PRESSURE: 137 MMHG | HEIGHT: 62 IN

## 2023-07-26 DIAGNOSIS — M43.10 SPONDYLOLISTHESIS, SITE UNSPECIFIED: ICD-10-CM

## 2023-07-26 DIAGNOSIS — M25.562 PAIN IN LEFT KNEE: ICD-10-CM

## 2023-07-26 PROCEDURE — 72110 X-RAY EXAM L-2 SPINE 4/>VWS: CPT

## 2023-07-26 PROCEDURE — 73562 X-RAY EXAM OF KNEE 3: CPT | Mod: LT

## 2023-07-26 PROCEDURE — 99214 OFFICE O/P EST MOD 30 MIN: CPT

## 2023-07-26 RX ORDER — GABAPENTIN 100 MG/1
100 CAPSULE ORAL
Qty: 30 | Refills: 2 | Status: ACTIVE | COMMUNITY
Start: 2022-03-23 | End: 1900-01-01

## 2023-07-26 NOTE — PHYSICAL EXAM
[Full] : is full [LE] : Sensory: Intact in bilateral lower extremities [Knee] : patellar 2+ and symmetric bilaterally [Ankle] : ankle 2+ and symmetric bilaterally [DP] : dorsalis pedis 2+ and symmetric bilaterally [PT] : posterior tibial 2+ and symmetric bilaterally [de-identified] : 4 views lumbar spine obtained today demonstrate left-sided lumbar curve measuring approximately 18 degrees from L2-L5.  Degenerative change in the concavity at L3-4 noted.  On the lateral projection normal lumbar lordosis.  Degeneration most prominent at L5-S1 with significant loss of disc height endplate sclerosis and minimal anterolisthesis.  Degenerative change with loss of disc height also noted L4-5.  No dynamic instability between flexion-extension.  No acute fractures.  Degenerative changes seen in the lower thoracic spine visualized as well.\par \par 3 views left knee demonstrate genu valgum.  Degenerative change along the lateral aspect lateral compartment with small osteophytes.  No significant patellofemoral arthrosis noted.  No acute fractures. [Poor Appearance] : well-appearing [Acute Distress] : not in acute distress [Obese] : not obese [Abl Mood] : in a normal mood [Abl Affect] : with normal affect [Poor Coordination] : normal coordination [Disorientation] : oriented x 3 [Antalgic] : antalgic [Painful] : not painful [SLR] : negative straight leg raise [FreeTextEntry2] : The pt is awake, alert and oriented to self, place and time, is comfortable and in no acute distress. Gait examination reveals a narrow based, non-ataxic, non-antalgic gait. Can heel and toe walk without difficulty. Inspection of neck, back and lower extremities bilaterally reveals no rashes or ecchymotic lesions.  There is no obvious abnormal spinal curvature in the sagittal and coronal planes with a slightly stooped forward posture noted. There is no tenderness over the cervical, thoracic or lumbar spine, or the upper and lower extremities musculature. No paraspinal lumbar tenderness. There is no sacroiliac tenderness. No greater trochanteric tenderness bilaterally. No atrophy or abnormal movements noted in the upper or lower extremities. There is no swelling noted in the upper or lower extremities bilaterally. No cervical lymphadenopathy noted anteriorly. No joint laxity noted in the upper and lower extremity joints bilaterally.\par  Hip range of motion is degrees internal rotation 30° external rotation without pain. Full range of motion of the shoulders bilaterally with no significant pain\par Negative straight leg raise to 45° in the sitting position bilaterally. There is no groin pain with hip internal rotation and a negative JULIAN test bilaterally.  [de-identified] : seen with her \par right sided thoracic paraspinal tenderness\par Clicking with minimal crepitus left knee range of motion.  Trace joint effusion [de-identified] : She can forward flex to her ankles and extend to 30° without pain.

## 2023-07-26 NOTE — HISTORY OF PRESENT ILLNESS
[8] : a current pain level of 8/10 [Daily] : ~He/She~ states the symptoms seem to be occuring daily [Rest] : relieved by rest [de-identified] : Patient is here today due to acute low back bilateral leg left worse than right at this time. Patient states has not slept in weeks needs to take 2 advil when going to sleep.\par s/p bilateral L4 MARTIN 7/9/22 [de-identified] : sleeping and in the am [de-identified] : advil

## 2023-07-26 NOTE — DISCUSSION/SUMMARY
[Medication Risks Reviewed] : Medication risks reviewed [de-identified] : Patient is not interested in any prescription medications but will continue's of Advil as needed.  Gabapentin 100 mg nightly was prescribed on a trial basis given her radiating pain down her left leg.\par \par She has reported clicking and some swelling in her left knee which is suggestive of meniscal tear.  Mild arthrosis noted on x-rays obtained today.  She can use Advil for her knee pain.\par \par Physical therapy prescription for her lumbar spine and left knee was provided and she may consider that.  Prescription for an MRI lumbar spine left knee was also provided and she may consider that as well.\par \par I will see her back in 4 to 6 weeks on an as-needed basis or prior to that if her symptoms worsen and she ends up going for the MRI.  Surgical intervention including lumbar decompression may be indicated if she has ongoing left leg pain\par \par The patient was educated regarding their condition, treatment options as well as prescribed course of treatment. \par Risks and benefits as well as alternatives to the proposed treatment were also provided to the patient \par They were given the opportunity to have all their questions answered to their satisfaction.\par \par Vital signs were reviewed with the patient and the patient was instructed to followup with their primary care provider for further management. There were no PAs or scribes used in the evaluation, exam or treatment plan discussion. The surgeon was the primary evaluating or treating physician as noted above.

## 2023-09-18 NOTE — ED PROVIDER NOTE - GASTROINTESTINAL [+], MLM
Left message for patient - informed of UTI and informed prescribed Bactrim for treatment and sent to pharmacy [facility] in [place] on [address].   NAUSEA/VOMITING

## 2023-09-21 ENCOUNTER — APPOINTMENT (OUTPATIENT)
Dept: ORTHOPEDIC SURGERY | Facility: CLINIC | Age: 83
End: 2023-09-21
Payer: MEDICARE

## 2023-09-21 VITALS — HEART RATE: 76 BPM | SYSTOLIC BLOOD PRESSURE: 154 MMHG | DIASTOLIC BLOOD PRESSURE: 84 MMHG

## 2023-09-21 DIAGNOSIS — M17.12 UNILATERAL PRIMARY OSTEOARTHRITIS, LEFT KNEE: ICD-10-CM

## 2023-09-21 PROCEDURE — 99212 OFFICE O/P EST SF 10 MIN: CPT | Mod: 25

## 2023-09-21 PROCEDURE — 20610 DRAIN/INJ JOINT/BURSA W/O US: CPT | Mod: LT

## 2023-09-21 RX ORDER — METHYLPRED ACET/NACL,ISO-OS/PF 40 MG/ML
40 VIAL (ML) INJECTION
Qty: 1 | Refills: 0 | Status: COMPLETED | OUTPATIENT
Start: 2023-09-21

## 2023-09-21 RX ORDER — LIDOCAINE HYDROCHLORIDE 5 MG/ML
0.5 INJECTION, SOLUTION INFILTRATION; PERINEURAL
Refills: 0 | Status: COMPLETED | OUTPATIENT
Start: 2023-09-21

## 2023-09-21 RX ADMIN — LIDOCAINE HYDROCHLORIDE 3 %: 5 INJECTION, SOLUTION INFILTRATION; INTRAVENOUS at 00:00

## 2023-09-21 RX ADMIN — METHYLPREDNISOLONE ACETATE 2 MG/ML: 40 INJECTION, SUSPENSION INTRA-ARTICULAR; INTRALESIONAL; INTRAMUSCULAR; SOFT TISSUE at 00:00

## 2023-12-05 RX ORDER — GABAPENTIN 100 MG/1
100 CAPSULE ORAL
Qty: 60 | Refills: 2 | Status: ACTIVE | COMMUNITY
Start: 2023-07-26 | End: 1900-01-01

## 2024-08-02 NOTE — ED ADULT NURSE REASSESSMENT NOTE - INV PAIN INTERVENTIONS-NUMBER SCALE
PT CALLED BACK AND SHE RS TO 8/7/24 AT 1 PM     positioned to decrease pressure/multiple medication modalities/unnecessary movement avoided

## 2025-06-10 ENCOUNTER — NON-APPOINTMENT (OUTPATIENT)
Age: 85
End: 2025-06-10

## 2025-06-10 ENCOUNTER — APPOINTMENT (OUTPATIENT)
Dept: OPHTHALMOLOGY | Facility: CLINIC | Age: 85
End: 2025-06-10
Payer: MEDICARE

## 2025-06-10 PROCEDURE — 92004 COMPRE OPH EXAM NEW PT 1/>: CPT

## 2025-06-10 PROCEDURE — 92134 CPTRZ OPH DX IMG PST SGM RTA: CPT

## 2025-06-17 NOTE — ED ADULT NURSE NOTE - DOES PATIENT HAVE ADVANCE DIRECTIVE
Orders:    traMADol (ULTRAM) 50 MG tablet; Take 1 tablet by mouth every 12 hours as needed for Pain for up to 30 days. Max Daily Amount: 100 mg    
No

## (undated) DEVICE — CATH IV SAFE BC BLU 22GAX1.0"

## (undated) DEVICE — CATH IV SAFE 24GX3/4

## (undated) DEVICE — TRAY EPIDURAL SINGLE DOSE

## (undated) DEVICE — SOL INJ LR 500ML

## (undated) DEVICE — GLV 7.5 ULTRAFREE MAX

## (undated) DEVICE — NDL SPNL WHIT 22GX3.5IN

## (undated) DEVICE — GLV 8 ULTRAFREE MAX

## (undated) DEVICE — PACK IV START WITH CHG